# Patient Record
Sex: FEMALE | Race: BLACK OR AFRICAN AMERICAN | NOT HISPANIC OR LATINO | Employment: FULL TIME | URBAN - METROPOLITAN AREA
[De-identification: names, ages, dates, MRNs, and addresses within clinical notes are randomized per-mention and may not be internally consistent; named-entity substitution may affect disease eponyms.]

---

## 2017-03-13 ENCOUNTER — PRENATAL OFFICE VISIT (OUTPATIENT)
Dept: NURSING | Facility: CLINIC | Age: 37
End: 2017-03-13
Payer: COMMERCIAL

## 2017-03-13 ENCOUNTER — TELEPHONE (OUTPATIENT)
Dept: FAMILY MEDICINE | Facility: CLINIC | Age: 37
End: 2017-03-13

## 2017-03-13 VITALS
WEIGHT: 191.75 LBS | TEMPERATURE: 98 F | BODY MASS INDEX: 30.82 KG/M2 | HEIGHT: 66 IN | SYSTOLIC BLOOD PRESSURE: 129 MMHG | DIASTOLIC BLOOD PRESSURE: 82 MMHG | HEART RATE: 72 BPM

## 2017-03-13 DIAGNOSIS — N91.2 AMENORRHEA: Primary | ICD-10-CM

## 2017-03-13 LAB — BETA HCG QUAL IFA URINE: NEGATIVE

## 2017-03-13 PROCEDURE — 99207 ZZC NO CHARGE NURSE ONLY: CPT

## 2017-03-13 PROCEDURE — 84703 CHORIONIC GONADOTROPIN ASSAY: CPT | Performed by: FAMILY MEDICINE

## 2017-03-13 RX ORDER — ARIPIPRAZOLE 2 MG/1
2 TABLET ORAL AT BEDTIME
COMMUNITY
End: 2017-11-02

## 2017-03-13 NOTE — PATIENT INSTRUCTIONS
"Try a bland \"BRAT\" diet for a few days, avoid dairy products (milk, cheese), avoid fried foods, try dry toast, crackers, pretzels, bananas, applesauce, rice without a lot of butter.     Make sure to drink plenty of clear liquids, avoid caffeine if possible.  If you continue to have nausea, breast-tenderness, and no period after 2 more weeks, do a home pregnancy test and call to schedule the nurse intake.    If you are vomiting more often, having diarrhea, fever or not urinating at least once in  8-12 hours, call to get seen or go to ER for evaluation of dehydration.  "

## 2017-03-13 NOTE — TELEPHONE ENCOUNTER
Called and spoke with patient.  She stated that she's been sick for a couple days in the morning.  She wakes up sick to her stomach but doesn't throw up.  She states her last menstrual cycle lasted for only 1 day.  That's never happened before per patient report.    RN offered provider visit based on symptoms vs RN pre- visit.  Did explain that first RN visit is an hour with a urine test and education.  Patient verbalized understanding.  She would like to see RN for this.  Scheduled today.    Silvia Angelo RN  [facility]

## 2017-03-13 NOTE — MR AVS SNAPSHOT
"              After Visit Summary   3/13/2017    Traci Weiner    MRN: [id number]           Patient Information     Date Of Birth          1980        Visit Information        Provider Department      3/13/2017 3:00 PM [facility]        Today's Diagnoses     Amenorrhea    -  1      Care Instructions    Try a bland \"BRAT\" diet for a few days, avoid dairy products (milk, cheese), avoid fried foods, try dry toast, crackers, pretzels, bananas, applesauce, rice without a lot of butter.     Make sure to drink plenty of clear liquids, avoid caffeine if possible.  If you continue to have nausea, breast-tenderness, and no period after 2 more weeks, do a home pregnancy test and call to schedule the nurse intake.    If you are vomiting more often, having diarrhea, fever or not urinating at least once in  8-12 hours, call to get seen or go to ER for evaluation of dehydration.        Follow-ups after your visit        Who to contact     If you have questions or need follow up information about today's clinic visit or your schedule please contact [facility] directly at [phone].  Normal or non-critical lab and imaging results will be communicated to you by delicioushart, letter or phone within 4 business days after the clinic has received the results. If you do not hear from us within 7 days, please contact the clinic through [employer] or phone. If you have a critical or abnormal lab result, we will notify you by phone as soon as possible.  Submit refill requests through [employer] or call your pharmacy and they will forward the refill request to us. Please allow 3 business days for your refill to be completed.          Additional Information About Your Visit        MyChart Information     [employer] lets you send messages to your doctor, view your test results, renew your prescriptions, schedule appointments and more. To sign up, go to [email or web address] . Click on \"Log in\" " "on the left side of the screen, which will take you to the Welcome page. Then click on \"Sign up Now\" on the right side of the page.     You will be asked to enter the access code listed below, as well as some personal information. Please follow the directions to create your username and password.     Your access code is: [email or web address]  Expires: 2017  3:45 PM     Your access code will  in 90 days. If you need help or a new code, please call your [facility] or [phone].        Care EveryWhere ID     This is your Care EveryWhere ID. This could be used by other organizations to access your [facility] medical records  [id number]        Your Vitals Were     Pulse Temperature Height Last Period Breastfeeding? BMI (Body Mass Index)    72 98  F (36.7  C) (Oral) 5' 5.5\" (1.664 m) 2017 (Exact Date) No 31.42 kg/m2       Blood Pressure from Last 3 Encounters:   17 129/82    Weight from Last 3 Encounters:   17 191 lb 12 oz (87 kg)              We Performed the Following     Beta HCG qual IFA urine        Primary Care Provider    None Specified       No primary provider on file.        Thank you!     Thank you for choosing [facility]  for your care. Our goal is always to provide you with excellent care. Hearing back from our patients is one way we can continue to improve our services. Please take a few minutes to complete the written survey that you may receive in the mail after your visit with us. Thank you!             Your Updated Medication List - Protect others around you: Learn how to safely use, store and throw away your medicines at www.disposemymeds.org.          This list is accurate as of: 3/13/17  3:45 PM.  Always use your most recent med list.                   Brand Name Dispense Instructions for use    ABILIFY 2 MG tablet   Generic drug:  ARIPiprazole      Take 2 mg by mouth At Bedtime       traZODone 25 MG Tabs half-tab    DESYREL     Take 25 mg by mouth At " Bedtime

## 2017-03-13 NOTE — NURSING NOTE
"Chief Complaint   Patient presents with     Nausea     reports nausea and emesis in the morning for past 2-3 days, breast tenderness.  LMP 3/3/17       Initial /82 (BP Location: Right arm, Patient Position: Chair, Cuff Size: Adult Regular)  Pulse 72  Temp 98  F (36.7  C) (Oral)  Ht 5' 5.5\" (1.664 m)  Wt 191 lb 12 oz (87 kg)  LMP 03/03/2017 (Exact Date)  Breastfeeding? No  BMI 31.42 kg/m2 Estimated body mass index is 31.42 kg/(m^2) as calculated from the following:    Height as of this encounter: 5' 5.5\" (1.664 m).    Weight as of this encounter: 191 lb 12 oz (87 kg).  Medication Reconciliation: complete      Patient reports history of 4 previous c-sections, last one 5 years ago.   States she had tubal ligation after last baby but was told it was still possible to get pregnant.   She reports her periods have been pretty regular and last about 5 days.  She had one episode of vaginal bleeding on 3/3/17 and then no more.   She denies abdominal pain or fever.  States she is nauseated off and on when faced with food during the day, emesis 1-2 times in the AM.  Breasts are tender to the point she cannot bear to wear a bra.     She recently moved from [facility].  Wants to establish care here.    Urine HCG negative today.   By dates she would be less than 2 weeks' pregnant if she is so may be too early to detect hormone in urine.     Vitals stable.   Denies diarrhea or abdominal pain.   States she is urinating a good amount, no signs of dehydration.     Advised her on bland/BRAT diet and to monitor, if symptoms persist and no period in a couple of weeks, home test and schedule if positive or return for another RN confirmation.  Advised I'd hold her prenatal questionnaire for a month in case she comes back and is pregnant.   Offered to schedule her to establish care, she declines at this time; will wait to see if she is pregnant in a few weeks first.  Advised her to stop at  to complete " form to have records transferred.  Patient verbalized understanding of and agreement with plan.  See patient instructions as well.  Monica Mark RN  [facility]

## 2017-03-13 NOTE — TELEPHONE ENCOUNTER
Reason for call:  Patient reporting a symptom    Symptom or request: Patient thinks she might have a tubal pregnancy but doesn't know if she is pregnant or not.  Patient doesn't understand that her first appointment would be with an RN for an hour.   She would like an RN to call her back to discuss.    Duration (how long have symptoms been present): ?    Have you been treated for this before? No    Additional comments: Please have an RN call this patient back.  She recently moved to [place] and would like to establish care at our Clinic.    Phone Number patient can be reached at:  Cell number on file:    Telephone Information:   Mobile [phone]       Best Time:  anytime    Can we leave a detailed message on this number:  YES    Call taken on 3/13/2017 at 2:28 PM by Reba Drew

## 2017-08-10 ENCOUNTER — OFFICE VISIT (OUTPATIENT)
Dept: FAMILY MEDICINE | Facility: CLINIC | Age: 37
End: 2017-08-10
Payer: COMMERCIAL

## 2017-08-10 VITALS
BODY MASS INDEX: 26.89 KG/M2 | HEIGHT: 68 IN | SYSTOLIC BLOOD PRESSURE: 110 MMHG | WEIGHT: 177.4 LBS | TEMPERATURE: 98.3 F | DIASTOLIC BLOOD PRESSURE: 66 MMHG | HEART RATE: 61 BPM | OXYGEN SATURATION: 100 %

## 2017-08-10 DIAGNOSIS — R11.2 NAUSEA AND VOMITING, INTRACTABILITY OF VOMITING NOT SPECIFIED, UNSPECIFIED VOMITING TYPE: ICD-10-CM

## 2017-08-10 DIAGNOSIS — Z32.01 PREGNANCY TEST POSITIVE: Primary | ICD-10-CM

## 2017-08-10 LAB
BETA HCG QUAL IFA URINE: NEGATIVE
HCG SERPL QL: NEGATIVE

## 2017-08-10 PROCEDURE — 84703 CHORIONIC GONADOTROPIN ASSAY: CPT | Performed by: PHYSICIAN ASSISTANT

## 2017-08-10 PROCEDURE — 99214 OFFICE O/P EST MOD 30 MIN: CPT | Performed by: PHYSICIAN ASSISTANT

## 2017-08-10 NOTE — MR AVS SNAPSHOT
"              After Visit Summary   8/10/2017    Traci Weiner    MRN: [id number]           Patient Information     Date Of Birth          1980        Visit Information        Provider Department      8/10/2017 10:20 AM Eli Alvarado PA-C [facility]        Today's Diagnoses     Pregnancy test positive    -  1    Nausea and vomiting, intractability of vomiting not specified, unspecified vomiting type           Follow-ups after your visit        Who to contact     If you have questions or need follow up information about today's clinic visit or your schedule please contact [facility] directly at [phone].  Normal or non-critical lab and imaging results will be communicated to you by payasUgymhart, letter or phone within 4 business days after the clinic has received the results. If you do not hear from us within 7 days, please contact the clinic through MyChart or phone. If you have a critical or abnormal lab result, we will notify you by phone as soon as possible.  Submit refill requests through [employer] or call your pharmacy and they will forward the refill request to us. Please allow 3 business days for your refill to be completed.          Additional Information About Your Visit        MyChart Information     [employer] lets you send messages to your doctor, view your test results, renew your prescriptions, schedule appointments and more. To sign up, go to [email or web address] . Click on \"Log in\" on the left side of the screen, which will take you to the Welcome page. Then click on \"Sign up Now\" on the right side of the page.     You will be asked to enter the access code listed below, as well as some personal information. Please follow the directions to create your username and password.     Your access code is: [email or web address]  Expires: 2017 11:14 AM     Your access code will  in 90 days. If you need help or a new code, please call your [facility] " "or [phone].        Care EveryWhere ID     This is your Care EveryWhere ID. This could be used by other organizations to access your [place] medical records  [id number]        Your Vitals Were     Pulse Temperature Height Last Period Pulse Oximetry Breastfeeding?    61 98.3  F (36.8  C) (Oral) 5' 7.84\" (1.723 m) 07/01/2017 100% No    BMI (Body Mass Index)                   27.1 kg/m2            Blood Pressure from Last 3 Encounters:   08/10/17 110/66   03/13/17 129/82    Weight from Last 3 Encounters:   08/10/17 177 lb 6.4 oz (80.5 kg)   03/13/17 191 lb 12 oz (87 kg)              We Performed the Following     Beta HCG qual IFA urine     HCG, qualitative        Primary Care Provider    None Specified       No primary provider on file.        Equal Access to Services     YA DUFFY : Tha Rivera, waconcha mora, silverio manjarrez, emilia yuen . So [facility] [phone].    ATENCIÓN: Si habla español, tiene a romo disposición servicios gratuitos de asistencia lingüística. Mt al [phone].    We comply with applicable federal civil rights laws and [place] laws. We do not discriminate on the basis of race, color, national origin, age, disability sex, sexual orientation or gender identity.            Thank you!     Thank you for choosing [facility]  for your care. Our goal is always to provide you with excellent care. Hearing back from our patients is one way we can continue to improve our services. Please take a few minutes to complete the written survey that you may receive in the mail after your visit with us. Thank you!             Your Updated Medication List - Protect others around you: Learn how to safely use, store and throw away your medicines at www.disposemymeds.org.          This list is accurate as of: 8/10/17 11:14 AM.  Always use your most recent med list.                   Brand Name Dispense Instructions for use " Diagnosis    ABILIFY 2 MG tablet   Generic drug:  ARIPiprazole      Take 2 mg by mouth At Bedtime        traZODone 25 mg Tabs half-tab    DESYREL     Take 25 mg by mouth At Bedtime

## 2017-08-10 NOTE — NURSING NOTE
"Chief Complaint   Patient presents with     Vomiting     x5 days     Patient Request     pregnancy blood test     Health Maintenance     Tetanus and Pap       Initial /66 (BP Location: Right arm, Patient Position: Chair, Cuff Size: Adult Large)  Pulse 61  Temp 98.3  F (36.8  C) (Oral)  Ht 5' 7.84\" (1.723 m)  Wt 177 lb 6.4 oz (80.5 kg)  LMP 07/01/2017  SpO2 100%  Breastfeeding? No  BMI 27.1 kg/m2 Estimated body mass index is 27.1 kg/(m^2) as calculated from the following:    Height as of this encounter: 5' 7.84\" (1.723 m).    Weight as of this encounter: 177 lb 6.4 oz (80.5 kg).  Medication Reconciliation: sahra Fountain MA      "

## 2017-08-10 NOTE — PROGRESS NOTES
SUBJECTIVE:                                                    Traic Weiner is a 37 year old female who presents to clinic today for the following health issues:      Concern: Pt is asking for a pregnancy test.     Vomiting       Duration: 5 days    Description (location/character/radiation):  Lower abdominal     Intensity:  Waxing and waning     Accompanying signs and symptoms: none    History (similar episodes/previous evaluation): None    Precipitating or alleviating factors: None    Therapies tried and outcome: None     Was asleep and had a pain in her stomach that woke her up. Nausea started and then she was vomiting.   Now it has been happening for the last 5 days. Always occurring in the morning.   Yesterday she was throwing up in the evening all day. No vomiting today.   Took a home pregnancy test yesterday at home. Was positive.   Patient had a tubal 5 years.     No diarrhea,. At the ER temp was 99.9. No pain with urination. NO vaginal discharge or odors. No heartburn.     Patient went to  and was sent to the ER. Went to [place] on 17 but left without being seen.     Had a period on 17 that was 4 days long. Then started spotting on 17 that was light pink and lasted 3 days.     Problem list and histories reviewed & adjusted, as indicated.  Additional history: as documented    There is no problem list on file for this patient.    Past Surgical History:   Procedure Laterality Date     APPENDECTOMY  2016     CHOLECYSTECTOMY  2004    gall bladder removed     GYN SURGERY       X 4       Social History   Substance Use Topics     Smoking status: Current Every Day Smoker     Packs/day: 0.30     Years: 20.00     Types: Cigarettes     Start date: 3/13/1997     Smokeless tobacco: Never Used     Alcohol use No     Family History   Problem Relation Age of Onset     DIABETES Mother      Thyroid Disease Mother      Hypertension Mother      DIABETES Father      Hypertension Father       "        Reviewed and updated as needed this visit by clinical staffTobacco  Allergies  Meds  Problems  Med Hx  Surg Hx  Fam Hx  Soc Hx        Reviewed and updated as needed this visit by Provider  Allergies  Meds  Problems         ROS:  Constitutional, HEENT, cardiovascular, pulmonary, gi and gu systems are negative, except as otherwise noted.      OBJECTIVE:   /66 (BP Location: Right arm, Patient Position: Chair, Cuff Size: Adult Large)  Pulse 61  Temp 98.3  F (36.8  C) (Oral)  Ht 5' 7.84\" (1.723 m)  Wt 177 lb 6.4 oz (80.5 kg)  LMP 07/01/2017  SpO2 100%  Breastfeeding? No  BMI 27.1 kg/m2  Body mass index is 27.1 kg/(m^2).  GENERAL: healthy, alert and no distress  RESP: lungs clear to auscultation - no rales, rhonchi or wheezes  CV: regular rate and rhythm, normal S1 S2, no S3 or S4, no murmur, click or rub, no peripheral edema   ABDOMEN: soft, nontender, no hepatosplenomegaly, no masses and bowel sounds normal    Diagnostic Test Results:  Results for orders placed or performed in visit on 08/10/17 (from the past 24 hour(s))   Beta HCG qual IFA urine   Result Value Ref Range    Beta HCG Qual IFA Urine Negative NEG       ASSESSMENT/PLAN:       ICD-10-CM    1. Pregnancy test positive Z32.01 Beta HCG qual IFA urine     HCG, qualitative   2. Nausea and vomiting, intractability of vomiting not specified, unspecified vomiting type R11.2    Negative urine test today, confirm with blood test. Reassured patient that pregnancy is unlikely with history of tubal. Benign exam with no vomiting today.Suspect viral illness. return to clinic if not improving as expected. Increase fluids, bland foods.     FUTURE APPOINTMENTS:       - Follow-up for annual visit or as needed    Eli Alvarado PA-C  [facility]  "

## 2017-08-11 ENCOUNTER — TELEPHONE (OUTPATIENT)
Dept: FAMILY MEDICINE | Facility: CLINIC | Age: 37
End: 2017-08-11

## 2017-08-11 NOTE — TELEPHONE ENCOUNTER
Patient returned call - relayed below information - patient had no questions.    Carley Miller RN  [facility]

## 2017-08-11 NOTE — TELEPHONE ENCOUNTER
Attempt # 1  Called patient at home number.  Was call answered?  No, left message to call nurse line for results.  Carley Miller RN  [facility] [place]

## 2017-10-17 ENCOUNTER — TELEPHONE (OUTPATIENT)
Dept: FAMILY MEDICINE | Facility: CLINIC | Age: 37
End: 2017-10-17

## 2017-10-17 NOTE — TELEPHONE ENCOUNTER
Panel Management Review      Patient has the following on her problem list: None      Composite cancer screening  Chart review shows that this patient is due/due soon for the following Pap Smear  Summary:    Patient is due/failing the following:   PAP    Action needed:   Patient needs office visit for physical with pap screen.    Type of outreach:    Phone, left message for patient to call back.  and Phone, spoke to patient.  scheduled physical 10/19/17 with FACUNDO Sarmiento    Questions for provider review:    None                                                                                                                                    YUNIER Antoine MA       Chart routed to closing chart .

## 2017-10-24 ENCOUNTER — TELEPHONE (OUTPATIENT)
Dept: FAMILY MEDICINE | Facility: CLINIC | Age: 37
End: 2017-10-24

## 2017-10-24 ENCOUNTER — OFFICE VISIT (OUTPATIENT)
Dept: FAMILY MEDICINE | Facility: CLINIC | Age: 37
End: 2017-10-24
Payer: COMMERCIAL

## 2017-10-24 VITALS
WEIGHT: 170 LBS | DIASTOLIC BLOOD PRESSURE: 67 MMHG | BODY MASS INDEX: 25.97 KG/M2 | HEART RATE: 74 BPM | TEMPERATURE: 98.1 F | OXYGEN SATURATION: 99 % | SYSTOLIC BLOOD PRESSURE: 97 MMHG

## 2017-10-24 DIAGNOSIS — R07.9 CHEST PAIN, UNSPECIFIED TYPE: Primary | ICD-10-CM

## 2017-10-24 DIAGNOSIS — R07.89 ATYPICAL CHEST PAIN: Primary | ICD-10-CM

## 2017-10-24 PROCEDURE — 93010 ELECTROCARDIOGRAM REPORT: CPT | Performed by: INTERNAL MEDICINE

## 2017-10-24 PROCEDURE — 99214 OFFICE O/P EST MOD 30 MIN: CPT | Performed by: NURSE PRACTITIONER

## 2017-10-24 PROCEDURE — 93005 ELECTROCARDIOGRAM TRACING: CPT | Performed by: NURSE PRACTITIONER

## 2017-10-24 ASSESSMENT — PAIN SCALES - GENERAL: PAINLEVEL: MODERATE PAIN (5)

## 2017-10-24 NOTE — MR AVS SNAPSHOT
After Visit Summary   10/24/2017    Traci Weiner    MRN: [id number]           Patient Information     Date Of Birth          1980        Visit Information        Provider Department      10/24/2017 10:00 AM Brittani Giordano APRN CNP [facility]        Today's Diagnoses     Chest pain, unspecified type    -  1      Care Instructions    You can take ibuprofen or tylenol as needed for pain  400 mg ibuprofen every 4 hours as needed for pain  Try heat pad    If the pain is not relieved with the above measures, is accompanied with nausea and/or excessive sweating or shortness of breath, or worsened with physical activity, call 911          Follow-ups after your visit        Your next 10 appointments already scheduled     Oct 31, 2017  9:00 AM CDT   PHYSICAL with Eli Alvarado PA-C   [facility] ([facility]    [address] [place] [address] [id number] [phone]              Who to contact     If you have questions or need follow up information about today's clinic visit or your schedule please contact [facility] directly at [phone].  Normal or non-critical lab and imaging results will be communicated to you by Allclasseshart, letter or phone within 4 business days after the clinic has received the results. If you do not hear from us within 7 days, please contact the clinic through [employer] or phone. If you have a critical or abnormal lab result, we will notify you by phone as soon as possible.  Submit refill requests through [employer] or call your pharmacy and they will forward the refill request to us. Please allow 3 business days for your refill to be completed.          Additional Information About Your Visit        AllclassesharClerky Information     [employer] lets you send messages to your doctor, view your test results, renew your prescriptions, schedule appointments and more. To sign up, go  "to [email or web address] . Click on \"Log in\" on the left side of the screen, which will take you to the Welcome page. Then click on \"Sign up Now\" on the right side of the page.     You will be asked to enter the access code listed below, as well as some personal information. Please follow the directions to create your username and password.     Your access code is: [email or web address]  Expires: 2017 11:14 AM     Your access code will  in 90 days. If you need help or a new code, please call your [place] clinic or [phone].        Care EveryWhere ID     This is your Care EveryWhere ID. This could be used by other organizations to access your [place] medical records  [id number]        Your Vitals Were     Pulse Temperature Last Period Pulse Oximetry Breastfeeding? BMI (Body Mass Index)    74 98.1  F (36.7  C) (Oral) 10/02/2017 99% No 25.97 kg/m2       Blood Pressure from Last 3 Encounters:   10/24/17 97/67   08/10/17 110/66   17 129/82    Weight from Last 3 Encounters:   10/24/17 170 lb (77.1 kg)   08/10/17 177 lb 6.4 oz (80.5 kg)   17 191 lb 12 oz (87 kg)              We Performed the Following     EKG 12-lead, tracing only        Primary Care Provider Office Phone # Fax #    [facility] [phone]       [address] [place] [id number]        Equal Access to Services     YA DUFFY : Hadii robyn ledezma hadasho Soomaali, waaxda luqadaha, qaybta kaalmada adeegyada, emilia fan. So [facility] [phone].    ATENCIÓN: Si habla español, tiene a romo disposición servicios gratuitos de asistencia lingüística. Llame al [phone].    We comply with applicable federal civil rights laws and [place] laws. We do not discriminate on the basis of race, color, national origin, age, disability, sex, sexual orientation, or gender identity.            Thank you!     Thank you for choosing [facility]  for your " care. Our goal is always to provide you with excellent care. Hearing back from our patients is one way we can continue to improve our services. Please take a few minutes to complete the written survey that you may receive in the mail after your visit with us. Thank you!             Your Updated Medication List - Protect others around you: Learn how to safely use, store and throw away your medicines at www.disposemymeds.org.          This list is accurate as of: 10/24/17 10:50 AM.  Always use your most recent med list.                   Brand Name Dispense Instructions for use Diagnosis    ABILIFY 2 MG tablet   Generic drug:  ARIPiprazole      Take 2 mg by mouth At Bedtime        traZODone 25 mg Tabs half-tab    DESYREL     Take 25 mg by mouth At Bedtime

## 2017-10-24 NOTE — PROGRESS NOTES
"  SUBJECTIVE:   Traci Weiner is a 37 year old female who presents to clinic today for the following health issues:      Chest Pain       She has had chest pain for 3 days  First noticed after waking up from a nap  The pain did not wake her up  She initially thought it was heart burn.   Tried drinking soda, belched and had temporary relief but it resumed yesterday  Midsternal chest pain today  Mild nausea, no emesis  Has anxiety at baseline, worse recently  Stress  Denies suicidal ideation  No family history of early onset heart disease  Her mom had stroke at age 50s  Her brother had a stroke at age 47  Denies paresthesias, weakness, confusion  No arm or jaw pain  Denies lightheadedness  She admits to increased stress recently  Had some pain relief with 200 mg ibuprofen  Pain worsened by: \"sitting up in bed\"   Worse in sitting position  Denies injury or trauma  This is a new pain for her      Problem list and histories reviewed & adjusted, as indicated.  Additional history: none    There is no problem list on file for this patient.    Past Surgical History:   Procedure Laterality Date     APPENDECTOMY  2016     CHOLECYSTECTOMY  2004    gall bladder removed     GYN SURGERY       X 4       Social History   Substance Use Topics     Smoking status: Current Every Day Smoker     Packs/day: 0.30     Years: 20.00     Types: Cigarettes     Start date: 3/13/1997     Smokeless tobacco: Never Used     Alcohol use No     Family History   Problem Relation Age of Onset     DIABETES Mother      Thyroid Disease Mother      Hypertension Mother      DIABETES Father      Hypertension Father              Reviewed and updated as needed this visit by clinical staffTobacco  Allergies  Meds  Med Hx  Surg Hx  Fam Hx  Soc Hx      Reviewed and updated as needed this visit by Provider         ROS:  Constitutional, HEENT, cardiovascular, pulmonary, gi and gu systems are negative, except as otherwise noted.      OBJECTIVE: "   BP 97/67 (BP Location: Right arm, Patient Position: Chair, Cuff Size: Adult Regular)  Pulse 74  Temp 98.1  F (36.7  C) (Oral)  Wt 170 lb (77.1 kg)  LMP 10/02/2017  SpO2 99%  Breastfeeding? No  BMI 25.97 kg/m2  Body mass index is 25.97 kg/(m^2).  GENERAL: healthy, alert and no distress  RESP: lungs clear to auscultation - no rales, rhonchi or wheezes  CV: regular rate and rhythm, normal S1 S2, no S3 or S4, no murmur, click or rub, no peripheral edema and peripheral pulses strong  ABDOMEN: soft, nontender, no hepatosplenomegaly, no masses and bowel sounds normal  MS: Pain to palpation mid sternum. No swelling or crepitus  SKIN: no suspicious lesions or rashes. Skin intact  NEURO: Normal strength and tone, mentation intact and speech normal, CN II-XII intact. Strength symmetric. Sensation intact    Diagnostic Test Results:  ECG: NSR    ASSESSMENT/PLAN:       ICD-10-CM    1. Chest pain, unspecified type R07.9 EKG 12-lead, tracing only     Chest pain reproducible and positional which is not consistent with cardiac etiology, but I did do ECG to rule out  Exam otherwise unremarkable. Consider anxiety or MSK cause  She is not interested in talking about stress or anxiety. She has follow up appt scheduled with her primary care provider and I advised she can discuss it at that time if she wishes  Neuro exam unremarakble  We discussed atypical S/S of ACS in females and when to call 911. Otherwise, recommend treating with over the counter NSAIDs and heat/ice      Patient Instructions   You can take ibuprofen or tylenol as needed for pain  400 mg ibuprofen every 4 hours as needed for pain  Try heat pad    If the pain is not relieved with the above measures, is accompanied with nausea and/or excessive sweating or shortness of breath, or worsened with physical activity, call 911      YAW Rodriguez [facility]

## 2017-10-24 NOTE — NURSING NOTE
"Chief Complaint   Patient presents with     URI       Initial BP 97/67 (BP Location: Right arm, Patient Position: Chair, Cuff Size: Adult Regular)  Pulse 74  Temp 98.1  F (36.7  C) (Oral)  Wt 170 lb (77.1 kg)  LMP 10/02/2017  SpO2 99%  Breastfeeding? No  BMI 25.97 kg/m2 Estimated body mass index is 25.97 kg/(m^2) as calculated from the following:    Height as of 8/10/17: 5' 7.84\" (1.723 m).    Weight as of this encounter: 170 lb (77.1 kg).  Medication Reconciliation: complete.  YUNIER Antoine MA    EKG was done.    "

## 2017-10-24 NOTE — PATIENT INSTRUCTIONS
You can take ibuprofen or tylenol as needed for pain  400 mg ibuprofen every 4 hours as needed for pain  Try heat pad    If the pain is not relieved with the above measures, is accompanied with nausea and/or excessive sweating or shortness of breath, or worsened with physical activity, call [phone]

## 2017-10-24 NOTE — TELEPHONE ENCOUNTER
Panel Management Review      Patient has the following on her problem list: None      Composite cancer screening  Chart review shows that this patient is due/due soon for the following Pap Smear  Summary:    Patient is due/failing the following:   PAP    Action needed:   Patient needs office visit for Physical with pap screen.    Type of outreach:    Spoke with patient and scheduled physical with pap screen on 10/30/17 withFACUNDO Sarmiento    Questions for provider review:    None                                                                                                                                    YUNIER Antoine MA       Chart routed to Closing chart .

## 2017-11-02 ENCOUNTER — OFFICE VISIT (OUTPATIENT)
Dept: FAMILY MEDICINE | Facility: CLINIC | Age: 37
End: 2017-11-02
Payer: COMMERCIAL

## 2017-11-02 VITALS
SYSTOLIC BLOOD PRESSURE: 100 MMHG | OXYGEN SATURATION: 100 % | BODY MASS INDEX: 25.3 KG/M2 | DIASTOLIC BLOOD PRESSURE: 58 MMHG | WEIGHT: 165.6 LBS | TEMPERATURE: 98 F | HEART RATE: 66 BPM

## 2017-11-02 DIAGNOSIS — Z00.00 ROUTINE GENERAL MEDICAL EXAMINATION AT A HEALTH CARE FACILITY: Primary | ICD-10-CM

## 2017-11-02 DIAGNOSIS — Z23 NEED FOR PROPHYLACTIC VACCINATION WITH TETANUS-DIPHTHERIA (TD): ICD-10-CM

## 2017-11-02 DIAGNOSIS — Z11.3 SCREEN FOR STD (SEXUALLY TRANSMITTED DISEASE): ICD-10-CM

## 2017-11-02 DIAGNOSIS — Z12.4 SCREENING FOR MALIGNANT NEOPLASM OF CERVIX: ICD-10-CM

## 2017-11-02 PROCEDURE — 87591 N.GONORRHOEAE DNA AMP PROB: CPT | Performed by: PHYSICIAN ASSISTANT

## 2017-11-02 PROCEDURE — 87491 CHLMYD TRACH DNA AMP PROBE: CPT | Performed by: PHYSICIAN ASSISTANT

## 2017-11-02 PROCEDURE — 87624 HPV HI-RISK TYP POOLED RSLT: CPT | Performed by: PHYSICIAN ASSISTANT

## 2017-11-02 PROCEDURE — 99395 PREV VISIT EST AGE 18-39: CPT | Mod: 25 | Performed by: PHYSICIAN ASSISTANT

## 2017-11-02 PROCEDURE — 90471 IMMUNIZATION ADMIN: CPT | Performed by: PHYSICIAN ASSISTANT

## 2017-11-02 PROCEDURE — G0145 SCR C/V CYTO,THINLAYER,RESCR: HCPCS | Performed by: PHYSICIAN ASSISTANT

## 2017-11-02 PROCEDURE — 90715 TDAP VACCINE 7 YRS/> IM: CPT | Performed by: PHYSICIAN ASSISTANT

## 2017-11-02 PROCEDURE — G0476 HPV COMBO ASSAY CA SCREEN: HCPCS | Performed by: PHYSICIAN ASSISTANT

## 2017-11-02 ASSESSMENT — PATIENT HEALTH QUESTIONNAIRE - PHQ9
SUM OF ALL RESPONSES TO PHQ QUESTIONS 1-9: 22
10. IF YOU CHECKED OFF ANY PROBLEMS, HOW DIFFICULT HAVE THESE PROBLEMS MADE IT FOR YOU TO DO YOUR WORK, TAKE CARE OF THINGS AT HOME, OR GET ALONG WITH OTHER PEOPLE: VERY DIFFICULT
SUM OF ALL RESPONSES TO PHQ QUESTIONS 1-9: 22

## 2017-11-02 NOTE — LETTER
[facility]   [address] [place]  [address]  [phone]                                   November 6, 2017    Traci Weiner  [address]  [facility] [address]        Dear Traci,    Your STD screening is negative.     Results for orders placed or performed in visit on 11/02/17   Chlamydia trachomatis PCR   Result Value Ref Range    Specimen Description Endocervical     Chlamydia Trachomatis PCR Negative NEG^Negative   Neisseria gonorrhoeae PCR   Result Value Ref Range    Specimen Descrip Endocervical     N Gonorrhea PCR Negative NEG^Negative       If you have any questions please call the clinic at [phone]    Sincerely,    Eli Alvarado PA-C  bmd

## 2017-11-02 NOTE — MR AVS SNAPSHOT
After Visit Summary   11/2/2017    Traci Weiner    MRN: [id number]           Patient Information     Date Of Birth          1980        Visit Information        Provider Department      11/2/2017 10:40 AM Eli Alvarado PA-C [facility]        Today's Diagnoses     Routine general medical examination at a health care facility    -  1    Screening for malignant neoplasm of cervix        Need for prophylactic vaccination with tetanus-diphtheria (TD)        Screen for STD (sexually transmitted disease)          Care Instructions      Preventive Health Recommendations  Female Ages 26 - 39  Yearly exam:   See your health care provider every year in order to    Review health changes.     Discuss preventive care.      Review your medicines if you your doctor has prescribed any.    Until age 30: Get a Pap test every three years (more often if you have had an abnormal result).    After age 30: Talk to your doctor about whether you should have a Pap test every 3 years or have a Pap test with HPV screening every 5 years.   You do not need a Pap test if your uterus was removed (hysterectomy) and you have not had cancer.  You should be tested each year for STDs (sexually transmitted diseases), if you're at risk.   Talk to your provider about how often to have your cholesterol checked.  If you are at risk for diabetes, you should have a diabetes test (fasting glucose).  Shots: Get a flu shot each year. Get a tetanus shot every 10 years.   Nutrition:     Eat at least 5 servings of fruits and vegetables each day.    Eat whole-grain bread, whole-wheat pasta and brown rice instead of white grains and rice.    Talk to your provider about Calcium and Vitamin D.     Lifestyle    Exercise at least 150 minutes a week (30 minutes a day, 5 days of the week). This will help you control your weight and prevent disease.    Limit alcohol to one drink per day.    No smoking.     Wear sunscreen to  "prevent skin cancer.    See your dentist every six months for an exam and cleaning.            Follow-ups after your visit        Who to contact     If you have questions or need follow up information about today's clinic visit or your schedule please contact [facility] directly at [phone].  Normal or non-critical lab and imaging results will be communicated to you by MyChart, letter or phone within 4 business days after the clinic has received the results. If you do not hear from us within 7 days, please contact the clinic through MyChart or phone. If you have a critical or abnormal lab result, we will notify you by phone as soon as possible.  Submit refill requests through [facility] or call your pharmacy and they will forward the refill request to us. Please allow 3 business days for your refill to be completed.          Additional Information About Your Visit        MyChart Information     [facility] lets you send messages to your doctor, view your test results, renew your prescriptions, schedule appointments and more. To sign up, go to [email or web address] . Click on \"Log in\" on the left side of the screen, which will take you to the Welcome page. Then click on \"Sign up Now\" on the right side of the page.     You will be asked to enter the access code listed below, as well as some personal information. Please follow the directions to create your username and password.     Your access code is: [email or web address]  Expires: 2017 11:14 AM     Your access code will  in 90 days. If you need help or a new code, please call your [facility] or [phone].        Care EveryWhere ID     This is your Care EveryWhere ID. This could be used by other organizations to access your [place] medical records  [id number]        Your Vitals Were     Pulse Temperature Last Period Pulse Oximetry Breastfeeding? BMI (Body Mass Index)    66 98  F (36.7  C) (Oral) 10/02/2017 100% No 25.3 kg/m2       " Blood Pressure from Last 3 Encounters:   11/02/17 100/58   10/24/17 97/67   08/10/17 110/66    Weight from Last 3 Encounters:   11/02/17 165 lb 9.6 oz (75.1 kg)   10/24/17 170 lb (77.1 kg)   08/10/17 177 lb 6.4 oz (80.5 kg)              We Performed the Following          ADMIN VACCINE, FIRST     Chlamydia trachomatis PCR     HPV High Risk Types DNA Cervical     Neisseria gonorrhoeae PCR     Pap imaged thin layer screen with HPV - recommended age 30 - 65 years (select HPV order below)     SCREENING QUESTIONS FOR ADULT IMMUNIZATIONS     TDAP VACCINE (ADACEL)        Primary Care Provider Office Phone # Fax #    [facility] [phone]       [address] [place] [address]        Equal Access to Services     YA DUFFY : Tha martínez Soorlando, waaxda luqadaha, qaybta kaalmada adeegyabrandon, emilia fan. So [facility] [phone].    ATENCIÓN: Si habla español, tiene a romo disposición servicios gratuitos de asistencia lingüística. Llame al [phone].    We comply with applicable federal civil rights laws and [place] laws. We do not discriminate on the basis of race, color, national origin, age, disability, sex, sexual orientation, or gender identity.            Thank you!     Thank you for choosing [facility]  for your care. Our goal is always to provide you with excellent care. Hearing back from our patients is one way we can continue to improve our services. Please take a few minutes to complete the written survey that you may receive in the mail after your visit with us. Thank you!             Your Updated Medication List - Protect others around you: Learn how to safely use, store and throw away your medicines at www.disposemymeds.org.      Notice  As of 11/2/2017 11:12 AM    You have not been prescribed any medications.

## 2017-11-02 NOTE — NURSING NOTE
"Chief Complaint   Patient presents with     Physical     Health Maintenance     Tetanus, Pap, and Influenza       Initial /58 (BP Location: Right arm, Patient Position: Chair, Cuff Size: Adult Regular)  Pulse 66  Temp 98  F (36.7  C) (Oral)  Wt 165 lb 9.6 oz (75.1 kg)  LMP 10/02/2017  SpO2 100%  Breastfeeding? No  BMI 25.3 kg/m2 Estimated body mass index is 25.3 kg/(m^2) as calculated from the following:    Height as of 8/10/17: 5' 7.84\" (1.723 m).    Weight as of this encounter: 165 lb 9.6 oz (75.1 kg).  Medication Reconciliation: complete     GAYATRI Fountain MA      "

## 2017-11-02 NOTE — NURSING NOTE
Prior to injection verified patient identity using patient's name and date of birth.  GAYATRI Fountain MA    VIS for Influenza given on same date of administration.  Staff signature/Title: GAYATRI Fountain MA    Per orders of Eli Alvarado PA-C, injection of Influenza given by Naomi Fountain. Patient instructed to remain in clinic for 15 minutes afterwards, and to report any adverse reaction to me immediately.

## 2017-11-02 NOTE — PROGRESS NOTES
SUBJECTIVE:   CC: Traci Weiner is an 37 year old woman who presents for preventive health visit.     Physical   Annual:     Getting at least 3 servings of Calcium per day::  Yes    Bi-annual eye exam::  Yes    Dental care twice a year::  NO    Sleep apnea or symptoms of sleep apnea::  Daytime drowsiness and Sleep apnea    Frequency of exercise::  None    Taking medications regularly::  Yes    Additional concerns today::  No    Denies a possibility of pregnancy. Has been having irregular periods.   Would like GC/Chlamydia testing.       Today's PHQ-2 Score:   PHQ-2 ( 1999 Pfizer) 11/2/2017   Q1: Little interest or pleasure in doing things 3   Q2: Feeling down, depressed or hopeless 3   PHQ-2 Score 6   Q1: Little interest or pleasure in doing things Nearly every day   Q2: Feeling down, depressed or hopeless Nearly every day   PHQ-2 Score 6       Abuse: Current or Past(Physical, Sexual or Emotional)- No  Do you feel safe in your environment - Yes    Social History   Substance Use Topics     Smoking status: Current Every Day Smoker     Packs/day: 0.30     Years: 20.00     Types: Cigarettes     Start date: 3/13/1997     Smokeless tobacco: Never Used     Alcohol use No     The patient does not drink >3 drinks per day nor >7 drinks per week.    Reviewed orders with patient.  Reviewed health maintenance and updated orders accordingly - Yes  Labs reviewed in EPIC    Mammogram not appropriate for this patient based on age.    Pertinent mammograms are reviewed under the imaging tab.  History of abnormal Pap smear: NO - age 30-65 PAP every 5 years with negative HPV co-testing recommended    Reviewed and updated as needed this visit by clinical staff  Tobacco  Allergies  Meds  Problems  Med Hx  Surg Hx  Fam Hx  Soc Hx          Reviewed and updated as needed this visit by Provider  Allergies  Meds  Problems            Review of Systems  C: NEGATIVE for fever, chills, change in weight  I: NEGATIVE for worrisome  rashes, moles or lesions  E: NEGATIVE for vision changes or irritation  ENT: NEGATIVE for ear, mouth and throat problems  R: NEGATIVE for significant cough or SOB  B: NEGATIVE for masses, tenderness or discharge  CV: NEGATIVE for chest pain, palpitations or peripheral edema  GI: NEGATIVE for nausea, abdominal pain, heartburn, or change in bowel habits  : NEGATIVE for unusual urinary or vaginal symptoms. Periods are irregular.  M: NEGATIVE for significant arthralgias or myalgia  N: NEGATIVE for weakness, dizziness or paresthesias  P: NEGATIVE for changes in mood or affect     OBJECTIVE:   /58 (BP Location: Right arm, Patient Position: Chair, Cuff Size: Adult Regular)  Pulse 66  Temp 98  F (36.7  C) (Oral)  Wt 165 lb 9.6 oz (75.1 kg)  LMP 10/02/2017  SpO2 100%  Breastfeeding? No  BMI 25.3 kg/m2  Physical Exam  GENERAL: healthy, alert and no distress  EYES: Eyes grossly normal to inspection, PERRL and conjunctivae and sclerae normal  HENT: ear canals and TM's normal, nose and mouth without ulcers or lesions  NECK: no adenopathy, no asymmetry, masses, or scars and thyroid normal to palpation  RESP: lungs clear to auscultation - no rales, rhonchi or wheezes  BREAST: normal without masses, tenderness or nipple discharge and no palpable axillary masses or adenopathy  CV: regular rate and rhythm, normal S1 S2, no S3 or S4, no murmur, click or rub,   ABDOMEN: soft, nontender, no hepatosplenomegaly, no masses and bowel sounds normal   (female): normal female external genitalia, normal urethral meatus, vaginal mucosa pink, moist, well rugated, and normal cervix/adnexa/uterus without masses or discharge  MS: no gross musculoskeletal defects noted, no edema  SKIN: no suspicious lesions or rashes  NEURO: Normal strength and tone, mentation intact and speech normal  PSYCH: mentation appears normal, affect normal/bright    ASSESSMENT/PLAN:       ICD-10-CM    1. Routine general medical examination at a [facility]  "facility Z00.00    2. Screening for malignant neoplasm of cervix Z12.4 Pap imaged thin layer screen with HPV - recommended age 30 - 65 years (select HPV order below)     HPV High Risk Types DNA Cervical   3. Need for prophylactic vaccination with tetanus-diphtheria (TD) Z23 TDAP VACCINE (ADACEL)          ADMIN VACCINE, FIRST   4. Screen for STD (sexually transmitted disease) Z11.3 Chlamydia trachomatis PCR     Neisseria gonorrhoeae PCR       COUNSELING:  Reviewed preventive health counseling, as reflected in patient instructions       Regular exercise       Healthy diet/nutrition       Immunizations    Vaccinated for: TDAP and Declined: Influenza due to Conscientious objector             Contraception       Safe sex practices/STD prevention       reports that she has been smoking Cigarettes.  She started smoking about 20 years ago. She has a 6.00 pack-year smoking history. She has never used smokeless tobacco.  Tobacco Cessation Action Plan: Information offered: Patient not interested at this time  Estimated body mass index is 25.3 kg/(m^2) as calculated from the following:    Height as of 8/10/17: 5' 7.84\" (1.723 m).    Weight as of this encounter: 165 lb 9.6 oz (75.1 kg).   Weight management plan: Discussed healthy diet and exercise guidelines and patient will follow up in 12 months in clinic to re-evaluate.    Counseling Resources:  ATP IV Guidelines  Pooled Cohorts Equation Calculator  Breast Cancer Risk Calculator  FRAX Risk Assessment  ICSI Preventive Guidelines  Dietary Guidelines for Americans, 2010  USDA's MyPlate  ASA Prophylaxis  Lung CA Screening    Eli Alvarado PA-C  [facility]  Answers for HPI/ROS submitted by the patient on 11/2/2017   PHQ-2 Score: 6  If you checked off any problems, how difficult have these problems made it for you to do your work, take care of things at home, or get along with other people?: Very difficult  PHQ9 TOTAL SCORE: 22    "

## 2017-11-03 LAB
C TRACH DNA SPEC QL NAA+PROBE: NEGATIVE
N GONORRHOEA DNA SPEC QL NAA+PROBE: NEGATIVE
SPECIMEN SOURCE: NORMAL
SPECIMEN SOURCE: NORMAL

## 2017-11-03 ASSESSMENT — PATIENT HEALTH QUESTIONNAIRE - PHQ9: SUM OF ALL RESPONSES TO PHQ QUESTIONS 1-9: 22

## 2017-11-06 ENCOUNTER — TELEPHONE (OUTPATIENT)
Dept: FAMILY MEDICINE | Facility: CLINIC | Age: 37
End: 2017-11-06

## 2017-11-06 LAB
COPATH REPORT: NORMAL
PAP: NORMAL

## 2017-11-06 NOTE — TELEPHONE ENCOUNTER
Attempt # 1  Called patient at home number.  Was call answered? Yes, relayed results for STD and the pap test is still waiting on the HPV results.       Carley Miller RN  [facility]      
Reason for Call:  Request for results:    Name of test or procedure: Pap/ std     Date of test of procedure: 11/3/17    Location of the test or procedure: [facility] to leave the result message on voice mail or with a family member? NO    Phone number Patient can be reached at:  Home number on file [phone] (home)    Additional comments: any    Call taken on 11/6/2017 at 2:35 PM by Katya Olivares  
patient

## 2017-11-08 LAB
FINAL DIAGNOSIS: NORMAL
HPV HR 12 DNA CVX QL NAA+PROBE: NEGATIVE
HPV16 DNA SPEC QL NAA+PROBE: NEGATIVE
HPV18 DNA SPEC QL NAA+PROBE: NEGATIVE
SPECIMEN DESCRIPTION: NORMAL

## 2017-11-09 ENCOUNTER — TELEPHONE (OUTPATIENT)
Dept: FAMILY MEDICINE | Facility: CLINIC | Age: 37
End: 2017-11-09

## 2017-11-09 DIAGNOSIS — A59.01 TRICHOMONAL VAGINITIS: Primary | ICD-10-CM

## 2017-11-09 RX ORDER — METRONIDAZOLE 500 MG/1
2000 TABLET ORAL ONCE
Qty: 4 TABLET | Refills: 1 | Status: SHIPPED | OUTPATIENT
Start: 2017-11-09 | End: 2017-11-09

## 2017-11-09 NOTE — TELEPHONE ENCOUNTER
Please call patient. Her pap smear showed a sexually transmitted infection called trichamonas. She will need to be treated for this with a medication called flagyl.   She needs to take one dose and her partner should take 1 dose. They should abstain from intercourse for 1 week after treatment.   RN can send prescription to preferred pharmacy. Provider aware of dose and flag, this is appropriate dose.   Eli Alvarado PA-C

## 2017-11-09 NOTE — TELEPHONE ENCOUNTER
Attempt # 1  Called patient at home number.  Was call answered?  Yes, relayed below information - verified preferred pharmacy - sent medication - patient verbalizes understanding - has no questions.    Carley Miller RN  [facility]

## 2018-01-02 ENCOUNTER — OFFICE VISIT (OUTPATIENT)
Dept: FAMILY MEDICINE | Facility: CLINIC | Age: 38
End: 2018-01-02
Payer: COMMERCIAL

## 2018-01-02 VITALS
HEART RATE: 67 BPM | TEMPERATURE: 98 F | WEIGHT: 165.8 LBS | SYSTOLIC BLOOD PRESSURE: 113 MMHG | BODY MASS INDEX: 26.02 KG/M2 | HEIGHT: 67 IN | OXYGEN SATURATION: 99 % | DIASTOLIC BLOOD PRESSURE: 75 MMHG

## 2018-01-02 DIAGNOSIS — A59.9 TRICHOMONAL INFECTION: Primary | ICD-10-CM

## 2018-01-02 LAB
SPECIMEN SOURCE: NORMAL
T VAGINALIS DNA SPEC QL NAA+PROBE: NORMAL

## 2018-01-02 PROCEDURE — 87661 TRICHOMONAS VAGINALIS AMPLIF: CPT | Performed by: PHYSICIAN ASSISTANT

## 2018-01-02 PROCEDURE — 99212 OFFICE O/P EST SF 10 MIN: CPT | Performed by: PHYSICIAN ASSISTANT

## 2018-01-02 RX ORDER — ARIPIPRAZOLE 2 MG/1
2 TABLET ORAL DAILY
COMMUNITY
End: 2019-08-09

## 2018-01-02 NOTE — PROGRESS NOTES
"  SUBJECTIVE:   Traci Weiner is a 37 year old female who presents to clinic today for the following health issues:      Patient is here to follow up on last appointment and to see if it is cleared now.  Pt states today is the last day of her period.    No discharge, odor.   Trichomonas was found on her recent pap smear. She was treated with flagyl.   Partner was treated as well.   Would like testing to confirm resolution.     Problem list and histories reviewed & adjusted, as indicated.  Additional history: as documented    There is no problem list on file for this patient.    Past Surgical History:   Procedure Laterality Date     APPENDECTOMY  2016     CHOLECYSTECTOMY      gall bladder removed     GYN SURGERY       X 4       Social History   Substance Use Topics     Smoking status: Current Every Day Smoker     Packs/day: 0.30     Years: 20.00     Types: Cigarettes     Start date: 3/13/1997     Smokeless tobacco: Never Used     Alcohol use No     Family History   Problem Relation Age of Onset     DIABETES Mother      Thyroid Disease Mother      Hypertension Mother      DIABETES Father      Hypertension Father              Reviewed and updated as needed this visit by clinical staffTobacco  Allergies  Meds  Med Hx  Surg Hx  Fam Hx  Soc Hx      Reviewed and updated as needed this visit by Provider         ROS:  Constitutional, HEENT, cardiovascular, pulmonary, gi and gu systems are negative, except as otherwise noted.      OBJECTIVE:   /75 (BP Location: Right arm, Patient Position: Chair, Cuff Size: Adult Regular)  Pulse 67  Temp 98  F (36.7  C) (Oral)  Ht 5' 6.53\" (1.69 m)  Wt 165 lb 12.8 oz (75.2 kg)  LMP 2017 (Exact Date)  SpO2 99%  BMI 26.33 kg/m2  Body mass index is 26.33 kg/(m^2).  GENERAL: healthy, alert and no distress    Diagnostic Test Results:  none     ASSESSMENT/PLAN:       ICD-10-CM    1. Trichomonal infection A59.9 Trichomonas vaginalis DNA PCR   Await " results, was adequately treated so suspect infection has resolved.     FUTURE APPOINTMENTS:       - Follow-up for annual visit or as needed    Eli Alvarado PA-C  [facility]

## 2018-01-02 NOTE — LETTER
[facility]  [address] [place]  [address]  [phone]        January 3, 2018    Traci Weiner  [address]  [address]  [facility] [address]        Dear Traci,    Your trichomonas culture was NEGATIVE.     Results for orders placed or performed in visit on 01/02/18   Trichomonas vaginalis DNA PCR   Result Value Ref Range    Specimen Description Urine     Trichomonas vaginalis DNA PCR  NOTV^Not Detected: Trichomonas target DNA is not detected. All internal controls meet acceptance criteria.     Not Detected: Trichomonas target DNA is not detected. All internal controls meet   acceptance criteria.          If you have any questions please call the clinic at [phone].    Sincerely,    Eli GATES

## 2018-01-02 NOTE — NURSING NOTE
"Chief Complaint   Patient presents with     Other       Initial /75 (BP Location: Right arm, Patient Position: Chair, Cuff Size: Adult Regular)  Pulse 67  Temp 98  F (36.7  C) (Oral)  Ht 5' 6.53\" (1.69 m)  Wt 165 lb 12.8 oz (75.2 kg)  LMP 12/29/2017 (Exact Date)  SpO2 99%  BMI 26.33 kg/m2 Estimated body mass index is 26.33 kg/(m^2) as calculated from the following:    Height as of this encounter: 5' 6.53\" (1.69 m).    Weight as of this encounter: 165 lb 12.8 oz (75.2 kg).  Medication Reconciliation: complete   Sabrina See DOUGIE Nails      "

## 2018-01-02 NOTE — MR AVS SNAPSHOT
"              After Visit Summary   2018    Traci Weiner    MRN: [id number]           Patient Information     Date Of Birth          1980        Visit Information        Provider Department      2018 1:00 PM Eli Alvarado PA-C [facility]        Today's Diagnoses     Trichomonal infection    -  1       Follow-ups after your visit        Who to contact     If you have questions or need follow up information about today's clinic visit or your schedule please contact [facility] directly at [phone].  Normal or non-critical lab and imaging results will be communicated to you by [employer], letter or phone within 4 business days after the clinic has received the results. If you do not hear from us within 7 days, please contact the clinic through [employer] or phone. If you have a critical or abnormal lab result, we will notify you by phone as soon as possible.  Submit refill requests through [employer] or call your pharmacy and they will forward the refill request to us. Please allow 3 business days for your refill to be completed.          Additional Information About Your Visit        MyChart Information     [employer] lets you send messages to your doctor, view your test results, renew your prescriptions, schedule appointments and more. To sign up, go to [email or web address] . Click on \"Log in\" on the left side of the screen, which will take you to the Welcome page. Then click on \"Sign up Now\" on the right side of the page.     You will be asked to enter the access code listed below, as well as some personal information. Please follow the directions to create your username and password.     Your access code is: [email or web address]  Expires: 2018  1:07 PM     Your access code will  in 90 days. If you need help or a new code, please call your [facility] or [phone].        Care EveryWhere ID     This is your Care EveryWhere ID. This could be used by " "other organizations to access your [place] medical records  [id number]        Your Vitals Were     Pulse Temperature Height Last Period Pulse Oximetry BMI (Body Mass Index)    67 98  F (36.7  C) (Oral) 5' 6.53\" (1.69 m) 12/29/2017 (Exact Date) 99% 26.33 kg/m2       Blood Pressure from Last 3 Encounters:   01/02/18 113/75   11/02/17 100/58   10/24/17 97/67    Weight from Last 3 Encounters:   01/02/18 165 lb 12.8 oz (75.2 kg)   11/02/17 165 lb 9.6 oz (75.1 kg)   10/24/17 170 lb (77.1 kg)              We Performed the Following     Trichomonas vaginalis DNA PCR        Primary Care Provider Office Phone # Fax #    [facility] [phone]       [address] [place] [address]        Equal Access to Services     YA DUFFY : Hadii aad ku hadasho Soorlando, waaxda luqadaha, qaybta kaalmada adeegyada, emilia yuen . So [facility] [phone].    ATENCIÓN: Si habla español, tiene a romo disposición servicios gratuitos de asistencia lingüística. Llame al [phone].    We comply with applicable federal civil rights laws and [place] laws. We do not discriminate on the basis of race, color, national origin, age, disability, sex, sexual orientation, or gender identity.            Thank you!     Thank you for choosing [facility]  for your care. Our goal is always to provide you with excellent care. Hearing back from our patients is one way we can continue to improve our services. Please take a few minutes to complete the written survey that you may receive in the mail after your visit with us. Thank you!             Your Updated Medication List - Protect others around you: Learn how to safely use, store and throw away your medicines at www.disposemymeds.org.          This list is accurate as of: 1/2/18  1:07 PM.  Always use your most recent med list.                   Brand Name Dispense Instructions for use Diagnosis    " ABILIFY 2 MG tablet   Generic drug:  ARIPiprazole      Take 2 mg by mouth daily        TRAZODONE HCL PO

## 2018-01-08 ENCOUNTER — TELEPHONE (OUTPATIENT)
Dept: FAMILY MEDICINE | Facility: CLINIC | Age: 38
End: 2018-01-08

## 2018-01-08 NOTE — TELEPHONE ENCOUNTER
Reason for Call:  Request for results:    Name of test or procedure:urine test     Date of test of procedure: 1/2/18    Location of the test or procedure: [facility] to leave the result message on voice mail or with a family member? YES    Phone number Patient can be reached at:  Home number on file [phone] (home)    Additional comments: please call twice if phone says not in service and please leave results in a message if PT is unavailable     Call taken on 1/8/2018 at 1:06 PM by Katya Olivares

## 2018-01-08 NOTE — TELEPHONE ENCOUNTER
Notes Recorded by Eli Alvarado PA-C on 1/3/2018 at 6:39 AM  Your trichomonas culture was NEGATIVE.   Eli Alvarado PA-C    Attempt # 1  Called patient at home number.  Was call answered?  Yes, relayed above results - patient had no questions.    Carley Miller RN  [facility]

## 2018-09-23 ENCOUNTER — TRANSFERRED RECORDS (OUTPATIENT)
Dept: HEALTH INFORMATION MANAGEMENT | Facility: CLINIC | Age: 38
End: 2018-09-23

## 2019-07-11 ENCOUNTER — COMMUNICATION - HEALTHEAST (OUTPATIENT)
Dept: SCHEDULING | Facility: CLINIC | Age: 39
End: 2019-07-11

## 2019-08-09 ENCOUNTER — OFFICE VISIT (OUTPATIENT)
Dept: FAMILY MEDICINE | Facility: CLINIC | Age: 39
End: 2019-08-09
Payer: COMMERCIAL

## 2019-08-09 VITALS
WEIGHT: 176 LBS | DIASTOLIC BLOOD PRESSURE: 80 MMHG | HEART RATE: 60 BPM | HEIGHT: 67 IN | TEMPERATURE: 98.4 F | BODY MASS INDEX: 27.62 KG/M2 | SYSTOLIC BLOOD PRESSURE: 120 MMHG | OXYGEN SATURATION: 100 %

## 2019-08-09 DIAGNOSIS — F17.200 SMOKER: ICD-10-CM

## 2019-08-09 DIAGNOSIS — N94.6 DYSMENORRHEA: ICD-10-CM

## 2019-08-09 DIAGNOSIS — Z00.00 ROUTINE GENERAL MEDICAL EXAMINATION AT A HEALTH CARE FACILITY: Primary | ICD-10-CM

## 2019-08-09 DIAGNOSIS — F32.0 MILD MAJOR DEPRESSION (H): ICD-10-CM

## 2019-08-09 LAB
CHOLEST SERPL-MCNC: 123 MG/DL
GLUCOSE BLD-MCNC: 96 MG/DL (ref 70–99)
HDLC SERPL-MCNC: 41 MG/DL
LDLC SERPL CALC-MCNC: 73 MG/DL
NONHDLC SERPL-MCNC: 82 MG/DL
TRIGL SERPL-MCNC: 45 MG/DL
TSH SERPL DL<=0.005 MIU/L-ACNC: 1.49 MU/L (ref 0.4–4)

## 2019-08-09 PROCEDURE — 82947 ASSAY GLUCOSE BLOOD QUANT: CPT | Performed by: PHYSICIAN ASSISTANT

## 2019-08-09 PROCEDURE — 99213 OFFICE O/P EST LOW 20 MIN: CPT | Mod: 25 | Performed by: PHYSICIAN ASSISTANT

## 2019-08-09 PROCEDURE — 36415 COLL VENOUS BLD VENIPUNCTURE: CPT | Performed by: PHYSICIAN ASSISTANT

## 2019-08-09 PROCEDURE — 99396 PREV VISIT EST AGE 40-64: CPT | Performed by: PHYSICIAN ASSISTANT

## 2019-08-09 PROCEDURE — 84443 ASSAY THYROID STIM HORMONE: CPT | Performed by: PHYSICIAN ASSISTANT

## 2019-08-09 PROCEDURE — 80061 LIPID PANEL: CPT | Performed by: PHYSICIAN ASSISTANT

## 2019-08-09 RX ORDER — ARIPIPRAZOLE 2 MG/1
2 TABLET ORAL DAILY
Qty: 30 TABLET | Refills: 1 | Status: SHIPPED | OUTPATIENT
Start: 2019-08-09 | End: 2019-08-09

## 2019-08-09 RX ORDER — ARIPIPRAZOLE 2 MG/1
2 TABLET ORAL DAILY
Qty: 30 TABLET | Refills: 1 | Status: SHIPPED | OUTPATIENT
Start: 2019-08-09 | End: 2019-11-12

## 2019-08-09 ASSESSMENT — ENCOUNTER SYMPTOMS
SORE THROAT: 0
PARESTHESIAS: 0
BREAST MASS: 0
EYE PAIN: 0
ABDOMINAL PAIN: 0
HEARTBURN: 0
HEADACHES: 1
NERVOUS/ANXIOUS: 0
CONSTIPATION: 0
WEAKNESS: 0
ARTHRALGIAS: 0
MYALGIAS: 1
DIARRHEA: 0
NAUSEA: 0
CHILLS: 0
FREQUENCY: 1
HEMATOCHEZIA: 0
SHORTNESS OF BREATH: 1
DIZZINESS: 0
PALPITATIONS: 0
DYSURIA: 0
HEMATURIA: 0
JOINT SWELLING: 0
COUGH: 0
FEVER: 0

## 2019-08-09 ASSESSMENT — PATIENT HEALTH QUESTIONNAIRE - PHQ9
10. IF YOU CHECKED OFF ANY PROBLEMS, HOW DIFFICULT HAVE THESE PROBLEMS MADE IT FOR YOU TO DO YOUR WORK, TAKE CARE OF THINGS AT HOME, OR GET ALONG WITH OTHER PEOPLE: VERY DIFFICULT
SUM OF ALL RESPONSES TO PHQ QUESTIONS 1-9: 10
SUM OF ALL RESPONSES TO PHQ QUESTIONS 1-9: 10

## 2019-08-09 ASSESSMENT — MIFFLIN-ST. JEOR: SCORE: 1493.57

## 2019-08-09 NOTE — PATIENT INSTRUCTIONS
1. Take 800mg of ibuprofen every 8 hours with your menstrual cramps. Take with food.   2. Schedule with the OB/GYN's at the [facility].   3. They will call you to schedule with the therapist.   4. Start the abilify daily.   5. Follow up in about 5 weeks.       Preventive Health Recommendations  Female Ages 40 to 49    Yearly exam:     See your health care provider every year in order to  1. Review health changes.   2. Discuss preventive care.    3. Review your medicines if your doctor prescribed any.      Get a Pap test every three years (unless you have an abnormal result and your provider advises testing more often).      If you get Pap tests with HPV test, you only need to test every 5 years, unless you have an abnormal result. You do not need a Pap test if your uterus was removed (hysterectomy) and you have not had cancer.      You should be tested each year for STDs (sexually transmitted diseases), if you're at risk.     Ask your doctor if you should have a mammogram.      Have a colonoscopy (test for colon cancer) if someone in your family has had colon cancer or polyps before age 50.       Have a cholesterol test every 5 years.       Have a diabetes test (fasting glucose) after age 45. If you are at risk for diabetes, you should have this test every 3 years.    Shots: Get a flu shot each year. Get a tetanus shot every 10 years.     Nutrition:     Eat at least 5 servings of fruits and vegetables each day.    Eat whole-grain bread, whole-wheat pasta and brown rice instead of white grains and rice.    Get adequate Calcium and Vitamin D.      Lifestyle    Exercise at least 150 minutes a week (an average of 30 minutes a day, 5 days a week). This will help you control your weight and prevent disease.    Limit alcohol to one drink per day.    No smoking.     Wear sunscreen to prevent skin cancer.    See your dentist every six months for an exam and cleaning.

## 2019-08-09 NOTE — LETTER
[facility]   [address] [place]  [address]  [phone]                                   August 12, 2019    Traci Weiner  [address]   [facility] [address]        Dear Traci,    Your labs are all normal. No concerns.     Results for orders placed or performed in visit on 08/09/19   Lipid panel reflex to direct LDL Fasting   Result Value Ref Range    Cholesterol 123 <200 mg/dL    Triglycerides 45 <150 mg/dL    HDL Cholesterol 41 (L) >49 mg/dL    LDL Cholesterol Calculated 73 <100 mg/dL    Non HDL Cholesterol 82 <130 mg/dL   Glucose whole blood   Result Value Ref Range    Glucose Whole Blood 96 70 - 99 mg/dL   TSH with free T4 reflex   Result Value Ref Range    TSH 1.49 0.40 - 4.00 mU/L       If you have any questions please call the clinic at [phone]    Sincerely,    Eli Alvarado PA-C  [facility]

## 2019-08-09 NOTE — PROGRESS NOTES
"   SUBJECTIVE:   CC: Traci Weiner is an 39 year old woman who presents for preventive health visit.     Healthy Habits:     Getting at least 3 servings of Calcium per day:  Yes    Bi-annual eye exam:  Yes    Dental care twice a year:  NO    Sleep apnea or symptoms of sleep apnea:  None    Diet:  Regular (no restrictions)    Frequency of exercise:  None    Taking medications regularly:  Yes    Barriers to taking medications:  None    Medication side effects:  None    PHQ-2 Total Score: 4    Additional concerns today:  No    Her menses is giving her issues. Having severe pain where she doesn't want to get out of the bed. All days are painful  Her last one lasted almost a month. It was heavy every time she used the bathroom. Normally her periods are 5 days. Her period in June was normal.     Smoking a 1/2ppd. Patient trying to quit on her own.     Stopped taking her medications. Mood are \"ridiculous\" She notes she just flips out for no reason. Depression is severe.   Suicidal thoughts at times. No plans.   She had been on ability and trazodone. She notes they made her sleep a lot and she stopped because her youngest was really young.       Today's PHQ-2 Score:   PHQ-2 ( 1999 Pfizer) 8/9/2019   Q1: Little interest or pleasure in doing things 1   Q2: Feeling down, depressed or hopeless 3   PHQ-2 Score 4   Q1: Little interest or pleasure in doing things Several days   Q2: Feeling down, depressed or hopeless Nearly every day   PHQ-2 Score 4       Abuse: Current or Past(Physical, Sexual or Emotional)- No  Do you feel safe in your environment? Yes    Social History     Tobacco Use     Smoking status: Current Every Day Smoker     Packs/day: 0.30     Years: 20.00     Pack years: 6.00     Types: Cigarettes     Start date: 3/13/1997     Smokeless tobacco: Never Used   Substance Use Topics     Alcohol use: No         Alcohol Use 8/9/2019   Prescreen: >3 drinks/day or >7 drinks/week? No   Prescreen: >3 drinks/day or >7 " drinks/week? -       Reviewed orders with patient.  Reviewed health maintenance and updated orders accordingly - Yes  Labs reviewed in [facility]    Mammogram Screening: Patient under age 50, mutual decision reflected in health maintenance.      Pertinent mammograms are reviewed under the imaging tab.  History of abnormal Pap smear: NO - age 30-65 PAP every 5 years with negative HPV co-testing recommended  PAP / HPV Latest Ref Rng & Units 11/2/2017   PAP - NIL   HPV 16 DNA NEG:Negative Negative   HPV 18 DNA NEG:Negative Negative   OTHER HR HPV NEG:Negative Negative     Reviewed and updated as needed this visit by clinical staff  Tobacco  Allergies  Meds  Problems  Med Hx  Surg Hx  Fam Hx         Reviewed and updated as needed this visit by Provider  Tobacco  Allergies  Meds  Problems  Med Hx  Surg Hx  Fam Hx            Review of Systems   Constitutional: Negative for chills and fever.   HENT: Negative for congestion, ear pain, hearing loss and sore throat.    Eyes: Negative for pain and visual disturbance.   Respiratory: Positive for shortness of breath. Negative for cough.    Cardiovascular: Positive for chest pain. Negative for palpitations and peripheral edema.   Gastrointestinal: Negative for abdominal pain, constipation, diarrhea, heartburn, hematochezia and nausea.   Breasts:  Negative for tenderness, breast mass and discharge.   Genitourinary: Positive for frequency. Negative for dysuria, genital sores, hematuria, pelvic pain, urgency, vaginal bleeding and vaginal discharge.   Musculoskeletal: Positive for myalgias. Negative for arthralgias and joint swelling.   Skin: Negative for rash.   Neurological: Positive for headaches. Negative for dizziness, weakness and paresthesias.   Psychiatric/Behavioral: Positive for mood changes. The patient is not nervous/anxious.         OBJECTIVE:   /80 (BP Location: Right arm, Patient Position: Chair, Cuff Size: Adult Regular)   Pulse 60   Temp 98.4  F (36.9  " C) (Oral)   Ht 1.698 m (5' 6.85\")   Wt 79.8 kg (176 lb)   LMP 07/02/2019   SpO2 100%   Breastfeeding? No   BMI 27.69 kg/m    Physical Exam  GENERAL: healthy, alert and no distress  EYES: Eyes grossly normal to inspection, PERRL and conjunctivae and sclerae normal  HENT: ear canals and TM's normal, nose and mouth without ulcers or lesions  NECK: no adenopathy, no asymmetry, masses, or scars and thyroid normal to palpation  RESP: lungs clear to auscultation - no rales, rhonchi or wheezes  BREAST: normal without masses, tenderness or nipple discharge and no palpable axillary masses or adenopathy  CV: regular rate and rhythm, normal S1 S2, no S3 or S4, no murmur, click or rub, no peripheral edema and peripheral pulses strong  ABDOMEN: soft, nontender, no hepatosplenomegaly, no masses and bowel sounds normal  MS: no gross musculoskeletal defects noted, no edema  SKIN: no suspicious lesions or rashes  NEURO: Normal strength and tone, mentation intact and speech normal  PSYCH: mentation appears normal, affect normal/bright    Diagnostic Test Results:    ASSESSMENT/PLAN:   1. Routine general medical examination at a health care facility  - Lipid panel reflex to direct LDL Fasting  - Glucose whole blood    2. Dysmenorrhea  Referral to GYN to consider IUD vs ablation. Patient is a smoker, not a candidate for OCP  - TSH with free T4 reflex  - OB/GYN REFERRAL    3. Mild major depression (H)  Uncontrolled. Did well with abilify in the past. Will restart and have patient start with counselor. return to clinic in 5 weeks.   - MENTAL HEALTH REFERRAL  - Adult; Outpatient Treatment; Individual/Couples/Family/Group Therapy/Health Psychology; [facility]: [facility] [phone]; We will contact you to schedule the appointment or please call with any questions  - ARIPiprazole (ABILIFY) 2 MG tablet; Take 1 tablet (2 mg) by mouth daily  Dispense: 30 tablet; Refill: 1    4. Smoker  Encouraged cessation. " "      COUNSELING:  Reviewed preventive health counseling, as reflected in patient instructions       Regular exercise       Healthy diet/nutrition       Safe sex practices/STD prevention       HIV screeninx in teen years, 1x in adult years, and at intervals if high risk    Estimated body mass index is 27.69 kg/m  as calculated from the following:    Height as of this encounter: 1.698 m (5' 6.85\").    Weight as of this encounter: 79.8 kg (176 lb).    Weight management plan: Discussed healthy diet and exercise guidelines     reports that she has been smoking cigarettes.  She started smoking about 22 years ago. She has a 6.00 pack-year smoking history. She has never used smokeless tobacco.  Tobacco Cessation Action Plan: Information offered: Patient not interested at this time    Counseling Resources:  ATP IV Guidelines  Pooled Cohorts Equation Calculator  Breast Cancer Risk Calculator  FRAX Risk Assessment  ICSI Preventive Guidelines  Dietary Guidelines for Americans,   USDA's MyPlate  ASA Prophylaxis  Lung CA Screening    Eli Alvarado PA-C  [facility]  Answers for HPI/ROS submitted by the patient on 2019   Annual Exam:  If you checked off any problems, how difficult have these problems made it for you to do your work, take care of things at home, or get along with other people?: Very difficult  PHQ9 TOTAL SCORE: 10    "

## 2019-08-10 ASSESSMENT — PATIENT HEALTH QUESTIONNAIRE - PHQ9: SUM OF ALL RESPONSES TO PHQ QUESTIONS 1-9: 10

## 2019-08-20 ENCOUNTER — OFFICE VISIT (OUTPATIENT)
Dept: OBGYN | Facility: CLINIC | Age: 39
End: 2019-08-20
Payer: COMMERCIAL

## 2019-08-20 VITALS
BODY MASS INDEX: 28.66 KG/M2 | DIASTOLIC BLOOD PRESSURE: 76 MMHG | SYSTOLIC BLOOD PRESSURE: 117 MMHG | OXYGEN SATURATION: 100 % | HEIGHT: 67 IN | HEART RATE: 71 BPM | WEIGHT: 182.6 LBS

## 2019-08-20 DIAGNOSIS — Z12.39 SCREENING FOR BREAST CANCER: ICD-10-CM

## 2019-08-20 DIAGNOSIS — N92.1 METRORRHAGIA: ICD-10-CM

## 2019-08-20 DIAGNOSIS — Z98.891 PREVIOUS CESAREAN SECTION: ICD-10-CM

## 2019-08-20 DIAGNOSIS — N85.2 ENLARGED UTERUS: ICD-10-CM

## 2019-08-20 DIAGNOSIS — N94.6 DYSMENORRHEA: Primary | ICD-10-CM

## 2019-08-20 DIAGNOSIS — Z11.3 SCREEN FOR STD (SEXUALLY TRANSMITTED DISEASE): ICD-10-CM

## 2019-08-20 PROCEDURE — 99244 OFF/OP CNSLTJ NEW/EST MOD 40: CPT | Performed by: OBSTETRICS & GYNECOLOGY

## 2019-08-20 RX ORDER — NAPROXEN 500 MG/1
500 TABLET ORAL 2 TIMES DAILY WITH MEALS
Qty: 30 TABLET | Refills: 0 | Status: SHIPPED | OUTPATIENT
Start: 2019-08-20 | End: 2021-04-12

## 2019-08-20 ASSESSMENT — MIFFLIN-ST. JEOR: SCORE: 1523.51

## 2019-08-20 NOTE — PROGRESS NOTES
"Chief Complaint   Patient presents with     Abnormal Uterine Bleeding       Initial /76 (BP Location: Left arm, Patient Position: Sitting, Cuff Size: Adult Regular)   Pulse 71   Ht 1.698 m (5' 6.85\")   Wt 82.8 kg (182 lb 9.6 oz)   LMP 2019   SpO2 100%   Breastfeeding? No   BMI 28.73 kg/m   Estimated body mass index is 28.73 kg/m  as calculated from the following:    Height as of this encounter: 1.698 m (5' 6.85\").    Weight as of this encounter: 82.8 kg (182 lb 9.6 oz).  BP completed using cuff size: regular    Questioned patient about current smoking habits.  Pt. currently smokes.  Advised about smoking cessation.          The following HM Due: mammogram      The following patient reported/Care Every where data was sent to:  P ABSTRACT QUALITY INITIATIVES [36228]  n/a      patient has appointment for today and orders have been placed     I was asked to see Traci Weiner by Eli Alvarado for consultation regarding painful periods.      HPI:  Traci Weiner is a 39 year old female  here for a consultation regarding:      Periods so bad she can't get any sleep because of the pain.  Tylenol, ibuprofen nothing helps.  Takes 800 mg ibuprofen last night and this morning.  My stomach feels like someone is inside me just pulling and pulling.   Mostly pain.  Bleeding does not bother her.  The pain \"kills me\"    Pain going on now for about one yr  Last month her period lasted a whole month.    Used to just be 5 days long and regular every month.   This period just started .      For couple months the bleeding has been very frequent.      Asked her primary provider about a hysterectomy and was referred here.    Stable relationship.  She had a tubal ligation done with last  section.   In a One yr relationship.    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    Smokes down to 1 cig a day     Patient's last menstrual period was 2019.         OB History    Para " Term  AB Living   4 4 4 0 0 4   SAB TAB Ectopic Multiple Live Births   0 0 0 0 4      # Outcome Date GA Lbr Erik/2nd Weight Sex Delivery Anes PTL Lv   4 Term 12 40w0d  2.551 kg (5 lb 10 oz) M CS-Unspec   KIMBER      Name: Dread   3 Term 01 40w0d  3.657 kg (8 lb 1 oz) M CS-Unspec   KIMBER      Name: Kahlil   2 Term 97 40w0d  3.827 kg (8 lb 7 oz) M CS-Unspec   KIMBER      Name: Remigio   1 Term 96 40w0d  3.402 kg (7 lb 8 oz) M CS-Unspec   KIMBER      Name: Nestor        Past GYN history:   Lab Results   Component Value Date    PAP NIL 2017     Social History     Tobacco Use     Smoking status: Current Some Day Smoker     Packs/day: 0.30     Years: 20.00     Pack years: 6.00     Types: Cigarettes     Start date: 3/13/1997     Smokeless tobacco: Never Used   Substance Use Topics     Alcohol use: No     Drug use: Yes     Types: Marijuana          Past Medical History:   Diagnosis Date     Depressive disorder        Past Surgical History:   Procedure Laterality Date     APPENDECTOMY  2016     CHOLECYSTECTOMY  2004    gall bladder removed     GYN SURGERY       X 4       Family History   Problem Relation Age of Onset     Diabetes Mother      Thyroid Disease Mother      Hypertension Mother      Diabetes Father      Hypertension Father          Medications:    Current Outpatient Medications:      ARIPiprazole (ABILIFY) 2 MG tablet, Take 1 tablet (2 mg) by mouth daily, Disp: 30 tablet, Rfl: 1     naproxen (NAPROSYN) 500 MG tablet, Take 1 tablet (500 mg) by mouth 2 times daily (with meals), Disp: 30 tablet, Rfl: 0    Allergies:  Morphine    ROS:  C: NEGATIVE for fever, chills, change in weight  I: NEGATIVE for worrisome rashes, moles or lesions  E: NEGATIVE for vision changes or irritation  E/M: NEGATIVE for ear, mouth and throat problems  R: NEGATIVE for significant cough or SOB  CV: NEGATIVE for chest pain, palpitations or peripheral edema  GI: NEGATIVE for nausea, abdominal pain,  "heartburn, or change in bowel habits, occ constipation and painful hemorrhoids.   : NEGATIVE for frequency, dysuria, hematuria, vaginal discharge, or irregular bleeding  M: NEGATIVE for significant arthralgias or myalgia  N: NEGATIVE for weakness, dizziness or paresthesias  E: NEGATIVE for temperature intolerance, skin/hair changes  P: positive for changes in mood or affect -- anxiety, stress and depression      EXAM:  /76 (BP Location: Left arm, Patient Position: Sitting, Cuff Size: Adult Regular)   Pulse 71   Ht 1.698 m (5' 6.85\")   Wt 82.8 kg (182 lb 9.6 oz)   LMP 2019   SpO2 100%   Breastfeeding? No   BMI 28.73 kg/m      General - pleasant female in no acute distress.  Neurological - Alert and oriented  Psych:  normal mood and affect.  normal respiratory and cardiovascular effort   Breast - deferred.  Abdomen - soft, nontender, nondistended.  Musculoskeletal - no gross deformities.  Skin- no rashes seen        Pelvic:  EG - normal adult female.  BUS - within normal limits.  Vagina - well rugated, no discharge.  Cervix - no lesions, no CMT.  Uterus - firm, normal size and nontender.  Adnexae - no masses or tenderness.  RV - deferred.    ASSESSMENT:  Encounter Diagnosis     Encounter Diagnoses   Name Primary?     Dysmenorrhea Yes     Metrorrhagia      Previous  section      Enlarged uterus      Screening for breast cancer      Screen for STD (sexually transmitted disease)          PLAN:   genprobe  pelvic ultrasound   EMB after US  discussed management options. Consider IUD or endometrial ablation.  If not improvement then may need a hysterectomy.   rx for siena Armstrong MD        CC  Eli Arenas    "

## 2019-08-20 NOTE — Clinical Note
Casey Benitez,I saw your patient, Traci Weiner, recently. Here is a copy of my note. Thanks for the consult, Natalie

## 2019-08-23 ENCOUNTER — ANCILLARY PROCEDURE (OUTPATIENT)
Dept: ULTRASOUND IMAGING | Facility: CLINIC | Age: 39
End: 2019-08-23
Attending: OBSTETRICS & GYNECOLOGY
Payer: COMMERCIAL

## 2019-08-23 DIAGNOSIS — N94.6 DYSMENORRHEA: ICD-10-CM

## 2019-08-23 DIAGNOSIS — N92.1 METRORRHAGIA: ICD-10-CM

## 2019-08-23 PROCEDURE — 76856 US EXAM PELVIC COMPLETE: CPT

## 2019-08-23 PROCEDURE — 76830 TRANSVAGINAL US NON-OB: CPT

## 2019-08-28 ENCOUNTER — TELEPHONE (OUTPATIENT)
Dept: SCHEDULING | Facility: CLINIC | Age: 39
End: 2019-08-28

## 2019-08-28 ENCOUNTER — TELEPHONE (OUTPATIENT)
Dept: OBGYN | Facility: CLINIC | Age: 39
End: 2019-08-28

## 2019-08-28 NOTE — TELEPHONE ENCOUNTER
Patient calling regarding results from her appt with RR on 8/20. It appears that the G/C results are still future and were not run - will contact the [facility] lab to see what happened. Per [facility] lab, the orders were cancelled d/t incorrect specimen collected in wrong container.    RR - do you want to see patient back to collect at an OV. Or place orders and have her come in a leave a dirty urine sample as lab only?    Patient is also questioning the US results - has not been received by RR. Will send to her to review and can call patient back with plan.  Ligia Jiang RN

## 2019-08-29 NOTE — TELEPHONE ENCOUNTER
Pt called today for results.  Discussed cancelled labs with her. She has appt 9/5/19 and will just get the labs done then.  Glenys Coronel RN

## 2019-09-12 DIAGNOSIS — Z11.3 SCREEN FOR STD (SEXUALLY TRANSMITTED DISEASE): ICD-10-CM

## 2019-09-12 PROCEDURE — 87491 CHLMYD TRACH DNA AMP PROBE: CPT | Performed by: OBSTETRICS & GYNECOLOGY

## 2019-09-12 PROCEDURE — 87591 N.GONORRHOEAE DNA AMP PROB: CPT | Performed by: OBSTETRICS & GYNECOLOGY

## 2019-09-13 ENCOUNTER — TELEPHONE (OUTPATIENT)
Dept: FAMILY MEDICINE | Facility: CLINIC | Age: 39
End: 2019-09-13

## 2019-09-13 NOTE — TELEPHONE ENCOUNTER
Reason for Call:  Request for results:    Name of test or procedure: std testing    Date of test of procedure: 9/12/19    Location of the test or procedure: [facility] to leave the result message on voice mail or with a family member? Not Applicable    Phone number Patient can be reached at:  Home number on file [phone] (home)    Additional comments: Anytime    Call taken on 9/13/2019 at 3:31 PM by Farhana Estrada

## 2019-09-13 NOTE — TELEPHONE ENCOUNTER
Testing was actually ordered by OB.  I called and spoke to patient, advised her of negative STD/normal tests.    Advised her the results with go to Dr. Armstrong who ordered them and she will contact her with any further concerns.    Patient verbalized understanding of and agreement with plan.    Monica Mark RN  [facility]

## 2019-09-17 ENCOUNTER — TELEPHONE (OUTPATIENT)
Dept: OBGYN | Facility: CLINIC | Age: 39
End: 2019-09-17

## 2019-09-17 NOTE — TELEPHONE ENCOUNTER
TC to patient per result note on 9/16. Discussed US results and need for an appointment. Pt stated understanding. Attempted scheduling pt while I had her on the phone and first available is not until 10/29 at [facility] or 10/23 at [facility]. Pt asked to be seen sooner than that. I did offer an appt on 10/17 (using a prenatal spot), but still wants to be seen sooner. Is there a day that you could fit her in on your schedule or a call day that you could see her? Thanks.   Karena [place], RN-BSN   19

## 2019-09-18 NOTE — TELEPHONE ENCOUNTER
Can you offer to schedule her on wed 9/25 when I am on call.  She will need 30 min appt.    Can put her on for ~ 1:00? Just make sure she can be flexible if we have to change the time and she should call first.

## 2019-09-18 NOTE — TELEPHONE ENCOUNTER
Pt called back. Scheduled on 9/25 at 1pm. Aware may not be seen exactly on time and will call an hour prior to appt.  Karena Montana RN-BSN

## 2019-09-20 ENCOUNTER — TELEPHONE (OUTPATIENT)
Dept: FAMILY MEDICINE | Facility: CLINIC | Age: 39
End: 2019-09-20

## 2019-09-20 NOTE — TELEPHONE ENCOUNTER
Notified patient that she has one more refill at [facility].  She will have this transferred to [facility].    Shelly Pulido RN,BSN  [facility]

## 2019-09-20 NOTE — TELEPHONE ENCOUNTER
Reason for call:  Other   Patient called regarding (reason for call): prescription  Additional comments: Patient is requesting refill for prescription: ARIPiprazole (ABILIFY) 2 MG tablet. Has one pill left at this time.   [facility]  Phone number to reach patient:  Home number on file [phone] (home)    Best Time:  anytime    Can we leave a detailed message on this number?  YES

## 2019-09-25 ENCOUNTER — OFFICE VISIT (OUTPATIENT)
Dept: OBGYN | Facility: CLINIC | Age: 39
End: 2019-09-25
Payer: COMMERCIAL

## 2019-09-25 VITALS
TEMPERATURE: 98.1 F | DIASTOLIC BLOOD PRESSURE: 69 MMHG | OXYGEN SATURATION: 99 % | WEIGHT: 179.5 LBS | SYSTOLIC BLOOD PRESSURE: 102 MMHG | BODY MASS INDEX: 28.17 KG/M2 | HEART RATE: 71 BPM | HEIGHT: 67 IN

## 2019-09-25 DIAGNOSIS — N94.6 DYSMENORRHEA: ICD-10-CM

## 2019-09-25 DIAGNOSIS — E61.1 IRON DEFICIENCY: ICD-10-CM

## 2019-09-25 DIAGNOSIS — N83.292 OTHER OVARIAN CYST, LEFT SIDE: ICD-10-CM

## 2019-09-25 DIAGNOSIS — N92.1 MENOMETRORRHAGIA: Primary | ICD-10-CM

## 2019-09-25 DIAGNOSIS — Z12.39 SCREENING FOR BREAST CANCER: ICD-10-CM

## 2019-09-25 LAB
ERYTHROCYTE [DISTWIDTH] IN BLOOD BY AUTOMATED COUNT: 13.3 % (ref 10–15)
HCT VFR BLD AUTO: 37.6 % (ref 35–47)
HGB BLD-MCNC: 12 G/DL (ref 11.7–15.7)
MCH RBC QN AUTO: 29 PG (ref 26.5–33)
MCHC RBC AUTO-ENTMCNC: 31.9 G/DL (ref 31.5–36.5)
MCV RBC AUTO: 91 FL (ref 78–100)
PLATELET # BLD AUTO: 114 10E9/L (ref 150–450)
RBC # BLD AUTO: 4.14 10E12/L (ref 3.8–5.2)
WBC # BLD AUTO: 8.1 10E9/L (ref 4–11)

## 2019-09-25 PROCEDURE — 36415 COLL VENOUS BLD VENIPUNCTURE: CPT | Performed by: OBSTETRICS & GYNECOLOGY

## 2019-09-25 PROCEDURE — 83540 ASSAY OF IRON: CPT | Performed by: OBSTETRICS & GYNECOLOGY

## 2019-09-25 PROCEDURE — 99214 OFFICE O/P EST MOD 30 MIN: CPT | Performed by: OBSTETRICS & GYNECOLOGY

## 2019-09-25 PROCEDURE — 83550 IRON BINDING TEST: CPT | Performed by: OBSTETRICS & GYNECOLOGY

## 2019-09-25 PROCEDURE — 85027 COMPLETE CBC AUTOMATED: CPT | Performed by: OBSTETRICS & GYNECOLOGY

## 2019-09-25 PROCEDURE — 82728 ASSAY OF FERRITIN: CPT | Performed by: OBSTETRICS & GYNECOLOGY

## 2019-09-25 ASSESSMENT — MIFFLIN-ST. JEOR: SCORE: 1509.45

## 2019-09-25 NOTE — PROGRESS NOTES
"Chief Complaint   Patient presents with     Consult     Ultrasound       Initial /69 (BP Location: Left arm, Patient Position: Sitting, Cuff Size: Adult Regular)   Pulse 71   Temp 98.1  F (36.7  C) (Oral)   Ht 1.698 m (5' 6.85\")   Wt 81.4 kg (179 lb 8 oz)   LMP 2019   SpO2 99%   Breastfeeding? No   BMI 28.24 kg/m   Estimated body mass index is 28.24 kg/m  as calculated from the following:    Height as of this encounter: 1.698 m (5' 6.85\").    Weight as of this encounter: 81.4 kg (179 lb 8 oz).  BP completed using cuff size: regular    Questioned patient about current smoking habits.  Pt. has never smoked.          The following HM Due: mammogram      The following patient reported/Care Every where data was sent to:  P ABSTRACT QUALITY INITIATIVES [[id number]]  n/a      patient has appointment for today and orders have been placed       HPI:  Traci Weiner is a 39 year old female here for a F/u today on heavy bleeding, recent ultrasound results    Patient's last menstrual period was 2019.  Bled for 5 days with the last 2 days extremely heavy, had to change pad q hour.   Periods are painful.  She feels tired.  Not taking iron pills.        PELVIC ULTRASOUND WITH ENDOVAGINAL TRANSDUCER  2019 1:54 PM      HISTORY: Dysmenorrhea. Metrorrhagia.     TECHNIQUE:  Endovaginal sonography was added to the transabdominal  exam.     COMPARISON: None.     FINDINGS:   Endometrium: Endometrium is 13 mm thick. Small fluid at the lower  uterine segment endometrium.     Uterus: Measures 10 x 3.5 x 5.3 cm. No focal myometrial lesion.     Right ovary: Normal measuring 3.9 x 3 x 1.9 cm.     Left ovary: Complex cyst is 1.9 cm. Otherwise unremarkable left ovary  that is 3.4 x 3.7 x 2.7 cm.     Additional findings: Trace free pelvic fluid.                                                                      IMPRESSION:    1. Complex cyst at the left ovary is 1.9 cm. Suggest follow-up pelvic  ultrasound in " "6-8 weeks.  2. Endometrium is normal in size. Trace fluid at the lower uterine  segment endometrium.  3. Trace free pelvic fluid.       OB Hx:     section x 4 , 4 boys     Past GYN history:   Lab Results   Component Value Date    PAP NIL 2017           Past Medical History:   Diagnosis Date     Depressive disorder        Past Surgical History:   Procedure Laterality Date     APPENDECTOMY  2016     CHOLECYSTECTOMY  2004    gall bladder removed     GYN SURGERY       X 4       Family History   Problem Relation Age of Onset     Diabetes Mother      Thyroid Disease Mother      Hypertension Mother      Diabetes Father      Hypertension Father          Medications:    Current Outpatient Medications:      ARIPiprazole (ABILIFY) 2 MG tablet, Take 1 tablet (2 mg) by mouth daily, Disp: 30 tablet, Rfl: 1     naproxen (NAPROSYN) 500 MG tablet, Take 1 tablet (500 mg) by mouth 2 times daily (with meals), Disp: 30 tablet, Rfl: 0    Allergies:  Morphine    EXAM:  Blood pressure 102/69, pulse 71, temperature 98.1  F (36.7  C), temperature source Oral, height 1.698 m (5' 6.85\"), weight 81.4 kg (179 lb 8 oz), last menstrual period 2019, SpO2 99 %, not currently breastfeeding.  BMI= Body mass index is 28.24 kg/m .  Patient's last menstrual period was 2019.  General - pleasant female in no acute distress.  Neurological - alert and oriented X 3  Psychiatric - normal mood and affect    No other physical examination was performed today as we spent over 50% of today's 20 minute visit in face-to-face discussion and counseling about issues noted in the ASSESSMENT.    ASSESSMENT:  Encounter Diagnoses   Name Primary?     Menometrorrhagia Yes     Dysmenorrhea      Other ovarian cyst, left side      Screening for breast cancer      Iron deficiency       (N92.1) Menometrorrhagia  (primary encounter diagnosis)  Comment:    Plan: CBC with platelets, Ferritin, Iron and iron         binding capacity, " Kendra-Operative Worksheet,         Case Request: dilation of the cervix,         NovaSure endometrial ablation, possible Mirena         IUD insertion         (N94.6) Dysmenorrhea  Comment: will likely improve with ablation       (N83.292) Other ovarian cyst, left side  Comment: Likely physiologic    (Z12.31) Screening for breast cancer  Comment:   Plan: MA Screening Digital Bilateral          (E61.1) Iron deficiency  Comment:    Plan: ferrous gluconate (FERGON) 324 (38 Fe) MG         tablet, Case Request:   dilation of the cervix,        NovaSure endometrial ablation, possible Mirena         IUD insertion        Discussion with the patient regarding the differential diagnosis of dysfunctional uterine bleeding, including  fibroids, adenomyosis, polyps, hyperplasia, cancer or endocrine disorders   Fibroids have been ruled out with the US but need to r/o hyperplasia with an endometrial biopsy. Patient declined procedure today .  Will come back for that.  If the biopsy is normal, we reviewed  management options at length     surgical:  endometrial ablation +/- mirena  Hysterectomy    or medical:  Mirena  OCP's ----> not option due to tobacco use  Progesterone     Patient would like to proceed with an endometrial ablation and possible Mirena insertion if her biopsy is normal.      Natalie Armstrong MD

## 2019-09-26 LAB
FERRITIN SERPL-MCNC: 7 NG/ML (ref 12–150)
IRON SATN MFR SERPL: 14 % (ref 15–46)
IRON SERPL-MCNC: 49 UG/DL (ref 35–180)
TIBC SERPL-MCNC: 354 UG/DL (ref 240–430)

## 2019-09-26 RX ORDER — FERROUS GLUCONATE 324(38)MG
324 TABLET ORAL
Qty: 60 TABLET | Refills: 3 | Status: ON HOLD | OUTPATIENT
Start: 2019-09-26 | End: 2020-01-30

## 2019-09-30 ENCOUNTER — TELEPHONE (OUTPATIENT)
Dept: OBGYN | Facility: CLINIC | Age: 39
End: 2019-09-30

## 2019-09-30 NOTE — TELEPHONE ENCOUNTER
Pt states she was told to make a f/u appointment for a procedure but does not know what. Please advise.    Pt cna be reached @ [phone] emily

## 2019-10-02 ENCOUNTER — HEALTH MAINTENANCE LETTER (OUTPATIENT)
Age: 39
End: 2019-10-02

## 2019-10-03 ENCOUNTER — PREP FOR PROCEDURE (OUTPATIENT)
Dept: OBGYN | Facility: CLINIC | Age: 39
End: 2019-10-03

## 2019-10-03 DIAGNOSIS — N92.0 HEAVY MENSTRUAL BLEEDING: Primary | ICD-10-CM

## 2019-10-03 NOTE — TELEPHONE ENCOUNTER
TC to patient. Scheduled appt for 10/30 for EMB. Pt stated that RR wanted procedure done 2 weeks prior to her having surgery. There are no surgery orders in patient's chart.   Karena Montana RN-BSN

## 2019-10-08 ENCOUNTER — HOSPITAL ENCOUNTER (OUTPATIENT)
Facility: CLINIC | Age: 39
End: 2019-10-08
Attending: OBSTETRICS & GYNECOLOGY | Admitting: OBSTETRICS & GYNECOLOGY
Payer: COMMERCIAL

## 2019-10-08 DIAGNOSIS — N92.0 HEAVY MENSTRUAL BLEEDING: ICD-10-CM

## 2019-10-08 NOTE — TELEPHONE ENCOUNTER
Type of surgery: gyn  Location of surgery: [facility]  Date and time of surgery: 12/5/19 730a  Surgeon: Nathaniel  Pre-Op Appt Date: tbd  Post-Op Appt Date: tbd   Packet sent out: Yes  Pre-cert/Authorization completed:  No  Date: 10/8/2019    Anya FAYE, Surgery Coordinator

## 2019-11-12 DIAGNOSIS — F32.0 MILD MAJOR DEPRESSION (H): ICD-10-CM

## 2019-11-12 NOTE — TELEPHONE ENCOUNTER
Requested Prescriptions   Pending Prescriptions Disp Refills     ARIPiprazole (ABILIFY) 2 MG tablet [Pharmacy Med Name: ARIPIPRAZOLE 2MG TABLETS] 30 tablet 0     Sig: TAKE 1 TABLET BY MOUTH EVERY DAY   Last Written Prescription Date:  8/9/19  Last Fill Quantity: 30,  # refills: 1   Last office visit: 8/9/2019 with prescribing provider:     Future Office Visit:   Next 5 appointments (look out 90 days)    Nov 27, 2019  3:30 PM CST  Office Visit with Natalie Armstrong MD  [facility] ([facility]) [address] [place] [id number] [phone]             Antipsychotic Medications Failed - 11/12/2019 12:29 PM        Failed - A1c or Glucose on file in past 12 months     No lab results found.    Please review patients last 3 weights. If a weight gain of >10 lbs exists, you may refill the prescription once after instructing the patient to schedule an appointment within the next 30 days.    Wt Readings from Last 3 Encounters:   09/25/19 81.4 kg (179 lb 8 oz)   08/20/19 82.8 kg (182 lb 9.6 oz)   08/09/19 79.8 kg (176 lb)             Passed - Blood pressure under 140/90 in past 12 months     BP Readings from Last 3 Encounters:   09/25/19 102/69   08/20/19 117/76   08/09/19 120/80                 Passed - Patient is 12 years of age or older        Passed - Lipid panel on file within the past 12 months     Recent Labs   Lab Test 08/09/19  1045   CHOL 123   TRIG 45   HDL 41*   LDL 73   NHDL 82               Passed - CBC on file in past 12 months     Recent Labs   Lab Test 09/25/19  1416   WBC 8.1   RBC 4.14   HGB 12.0   HCT 37.6   *                 Passed - Heart Rate on file within past 12 months     Pulse Readings from Last 3 Encounters:   09/25/19 71   08/20/19 71   08/09/19 60               Passed - Medication is active on med list        Passed - Patient is not pregnant        Passed - No positve pregnancy test on file in past 12 months        Passed - Recent  "(6 mo) or future (30 days) visit within the authorizing provider's specialty     Patient had office visit in the last 6 months or has a visit in the next 30 days with authorizing provider or within the authorizing provider's specialty.  See \"Patient Info\" tab in inbasket, or \"Choose Columns\" in Meds & Orders section of the refill encounter.              "

## 2019-11-14 RX ORDER — ARIPIPRAZOLE 2 MG/1
TABLET ORAL
Qty: 30 TABLET | Refills: 0 | Status: SHIPPED | OUTPATIENT
Start: 2019-11-14 | End: 2019-12-30

## 2019-11-14 NOTE — TELEPHONE ENCOUNTER
Routing refill request to provider for review/approval because:  Labs not current  Shelly Pulido, RN,BSN  [facility]

## 2019-11-25 NOTE — PROGRESS NOTES
"Chief Complaint   Patient presents with     Endometrial Biopsy       Initial /70 (BP Location: Left arm, Patient Position: Sitting, Cuff Size: Adult Regular)   Pulse 66   Temp 97.9  F (36.6  C) (Oral)   Ht 1.698 m (5' 6.85\")   Wt 80.3 kg (177 lb 1.6 oz)   LMP 2019   SpO2 98%   Breastfeeding No   BMI 27.86 kg/m   Estimated body mass index is 27.86 kg/m  as calculated from the following:    Height as of this encounter: 1.698 m (5' 6.85\").    Weight as of this encounter: 80.3 kg (177 lb 1.6 oz).  BP completed using cuff size: regular    Questioned patient about current smoking habits.  Pt. currently smokes.  Advised about smoking cessation.          The following HM Due: mammogram      The following patient reported/Care Every where data was sent to:  P ABSTRACT QUALITY INITIATIVES [[id number]]  n/a      patient has appointment for today and orders have been placed at last visit.        HPI:  Zev Weiner is a 39 year old female  here for an endometrial biopsy. She is scheduled for an endometrial ablation next week.     just finishing a period and it was extremely painful.   Took a vicodin from her mom which helped a little, because her naprosyn does not help at all.     Patient reports that her mother had a full hysterectomy after she gave birth to zev.     Patient's last menstrual period was 2019.           Past GYN history:   Lab Results   Component Value Date    PAP NIL 2017           Past Medical History:   Diagnosis Date     Depressive disorder        Past Surgical History:   Procedure Laterality Date     APPENDECTOMY  2016     CHOLECYSTECTOMY  2004    gall bladder removed     GYN SURGERY       X 4       Family History   Problem Relation Age of Onset     Diabetes Mother      Thyroid Disease Mother      Hypertension Mother      Diabetes Father      Hypertension Father          Medications:    Current Outpatient Medications:      ARIPiprazole (ABILIFY) " "2 MG tablet, TAKE 1 TABLET BY MOUTH EVERY DAY, Disp: 30 tablet, Rfl: 0     ferrous gluconate (FERGON) 324 (38 Fe) MG tablet, Take 1 tablet (324 mg) by mouth daily (with breakfast), Disp: 60 tablet, Rfl: 3     naproxen (NAPROSYN) 500 MG tablet, Take 1 tablet (500 mg) by mouth 2 times daily (with meals), Disp: 30 tablet, Rfl: 0    Allergies:  Morphine    ROS:  She denies abnormal vaginal bleeding, discharge or unusual pelvic pain, no dysuria, frequency or hematuria.    EXAM:  /70 (BP Location: Left arm, Patient Position: Sitting, Cuff Size: Adult Regular)   Pulse 66   Temp 97.9  F (36.6  C) (Oral)   Ht 1.698 m (5' 6.85\")   Wt 80.3 kg (177 lb 1.6 oz)   LMP 11/20/2019   SpO2 98%   Breastfeeding No   BMI 27.86 kg/m      General - tired female in no acute distress.  Neurological - Alert and oriented  Psych:  Anxious  normal respiratory and cardiovascular effort   Breast - deferred.  Abdomen - soft, nontender, nondistended.  Musculoskeletal - no gross deformities.  Skin- no rashes seen        Pelvic:  EG - normal adult female.  BUS - within normal limits.  Vagina - well rugated, no discharge.  Cervix - no lesions, tender.  Uterus - 9 wk size and very tender on exam. Feels scarred to anterior abdominal wall.     Adnexae - no masses or tenderness.  RV - deferred.    PROCEDURE:    After obtaining consent from the patient, an endometrial biopsy was performed.  A bimanual exam was first done.  The cervix was prepped with betadine and a tenaculum placed on the cervix.  The cervical os was dilated with a small dilator and the uterus sounded to 9 cm.  The pipelle was inserted with some resistance. The first pass was just blood, second pass was performed.   A moderate amount of tissue was obtained and sent to pathology.  She tolerated the procedure with A LOT of pain.  No complications.        ASSESSMENT:  Encounter Diagnoses   Name Primary?     Encounter for biopsy Yes     Dysmenorrhea      Menometrorrhagia     "     PLAN:   We reviewed management options again today.  We discussed the option of NovaSure endometrial ablation with or without the Mirena IUD insertion at the time of the procedure.  We also discussed the option of a total hysterectomy which would need to be done through laparotomy given her 4 prior C-sections.   We discussed this would be the most definitive option.   Especially considering her significant uterine tenderness on exam and the severe pain she is experiencing with her periods.  Discussed recovery is much longer for hysterectomy than an ablation, however hysterectomy would be more definitive and guarantee resolution of her periods.  Patient is requesting to proceed with a hysterectomy, for more definitive treatment.  She has already had a tubal ligation so sterilization is not an issue.  Discussed route of surgery, recovery, risks of bleeding, infection, and injury to bowel and bladder.  At this point she would like to go ahead and cancel her ablation and be rescheduled for a full hysterectomy.    Endometrial biopsy was done and sent to pathology today.    Natalie Armstrong MD

## 2019-11-26 DIAGNOSIS — Z01.818 PRE-OP EXAM: Primary | ICD-10-CM

## 2019-11-26 DIAGNOSIS — N92.0 MENORRHAGIA WITH REGULAR CYCLE: ICD-10-CM

## 2019-11-27 ENCOUNTER — OFFICE VISIT (OUTPATIENT)
Dept: OBGYN | Facility: CLINIC | Age: 39
End: 2019-11-27
Payer: COMMERCIAL

## 2019-11-27 VITALS
OXYGEN SATURATION: 98 % | SYSTOLIC BLOOD PRESSURE: 110 MMHG | HEIGHT: 67 IN | WEIGHT: 177.1 LBS | TEMPERATURE: 97.9 F | DIASTOLIC BLOOD PRESSURE: 70 MMHG | BODY MASS INDEX: 27.8 KG/M2 | HEART RATE: 66 BPM

## 2019-11-27 DIAGNOSIS — N92.1 MENOMETRORRHAGIA: Primary | ICD-10-CM

## 2019-11-27 DIAGNOSIS — Z76.89 ENCOUNTER FOR BIOPSY: ICD-10-CM

## 2019-11-27 DIAGNOSIS — N94.6 DYSMENORRHEA: ICD-10-CM

## 2019-11-27 LAB — HCG UR QL: NEGATIVE

## 2019-11-27 PROCEDURE — 99212 OFFICE O/P EST SF 10 MIN: CPT | Mod: 25 | Performed by: OBSTETRICS & GYNECOLOGY

## 2019-11-27 PROCEDURE — 88305 TISSUE EXAM BY PATHOLOGIST: CPT | Mod: 26 | Performed by: OBSTETRICS & GYNECOLOGY

## 2019-11-27 PROCEDURE — 58100 BIOPSY OF UTERUS LINING: CPT | Performed by: OBSTETRICS & GYNECOLOGY

## 2019-11-27 PROCEDURE — 88305 TISSUE EXAM BY PATHOLOGIST: CPT | Performed by: OBSTETRICS & GYNECOLOGY

## 2019-11-27 PROCEDURE — 81025 URINE PREGNANCY TEST: CPT | Performed by: OBSTETRICS & GYNECOLOGY

## 2019-11-27 ASSESSMENT — MIFFLIN-ST. JEOR: SCORE: 1498.56

## 2019-11-27 NOTE — PATIENT INSTRUCTIONS
Endometrial Biopsy  Post-Procedure Patient Instructions      Please monitor for any of the following:      Fever   Cramping after 48 hours   Bleeding for 24-48 hours that is heavier than a normal period   Any bleeding that soaks more than 1 pad per hour      Please call your health care provider if you develop any of the above symptoms or problems.     [facility]   Nurse Triage Line [phone]      Use pads, not tampons, for the bleeding. You may resume sexual relations in 2-3 days after bleeding has stopped.

## 2019-12-02 ENCOUNTER — TELEPHONE (OUTPATIENT)
Dept: OBGYN | Facility: CLINIC | Age: 39
End: 2019-12-02

## 2019-12-02 NOTE — TELEPHONE ENCOUNTER
Patient calling to update RR on what she would like to do hysterectomy. Will send message to RR to update her to put in new surgery orders. Patient would like to have the hysterectomy on 12/5 in place of the ablation if possible. Made patient aware that new surgery orders will need to be placed and there may not be time in the OR to have the hysterectomy in place of ablation, but will send an FYI to surgery scheduler as well she is aware.  Ligia Garcia RN     Elevated BG to 472 on admission in setting of h/o DM and sepsis, downtrending s/p NS 2L, low SS  - c/w corrective low dose SS  - HbA1C= 7.7 Likely exacerbated by sepsis  Impoved  Started Lantus 3U hs today & will continue ISS

## 2019-12-03 LAB — COPATH REPORT: NORMAL

## 2019-12-30 ENCOUNTER — OFFICE VISIT (OUTPATIENT)
Dept: FAMILY MEDICINE | Facility: CLINIC | Age: 39
End: 2019-12-30
Payer: COMMERCIAL

## 2019-12-30 VITALS
HEIGHT: 67 IN | TEMPERATURE: 98 F | DIASTOLIC BLOOD PRESSURE: 80 MMHG | HEART RATE: 82 BPM | WEIGHT: 179 LBS | BODY MASS INDEX: 28.09 KG/M2 | SYSTOLIC BLOOD PRESSURE: 111 MMHG

## 2019-12-30 DIAGNOSIS — F32.0 MILD MAJOR DEPRESSION (H): ICD-10-CM

## 2019-12-30 PROCEDURE — 99213 OFFICE O/P EST LOW 20 MIN: CPT | Performed by: PHYSICIAN ASSISTANT

## 2019-12-30 RX ORDER — ARIPIPRAZOLE 2 MG/1
2 TABLET ORAL DAILY
Qty: 90 TABLET | Refills: 0 | Status: SHIPPED | OUTPATIENT
Start: 2019-12-30 | End: 2021-04-12

## 2019-12-30 ASSESSMENT — PATIENT HEALTH QUESTIONNAIRE - PHQ9: SUM OF ALL RESPONSES TO PHQ QUESTIONS 1-9: 21

## 2019-12-30 ASSESSMENT — MIFFLIN-ST. JEOR: SCORE: 1507.18

## 2019-12-30 NOTE — PROGRESS NOTES
Subjective     Traci Weiner is a 39 year old female who presents to clinic today for the following health issues:    HPI   Depression Followup    How are you doing with your depression since your last visit? Worsened     Are you having other symptoms that might be associated with depression? No    Have you had a significant life event?  No     Are you feeling anxious or having panic attacks?   No    Do you have any concerns with your use of alcohol or other drugs? No    Social History     Tobacco Use     Smoking status: Current Some Day Smoker     Packs/day: 0.30     Years: 20.00     Pack years: 6.00     Types: Cigarettes     Start date: 3/13/1997     Smokeless tobacco: Never Used   Substance Use Topics     Alcohol use: No     Drug use: Yes     Types: Marijuana     PHQ 11/2/2017 8/9/2019   PHQ-9 Total Score 22 10   Q9: Thoughts of better off dead/self-harm past 2 weeks Not at all Not at all     No flowsheet data found.  Last PHQ-9 12/30/2019   1.  Little interest or pleasure in doing things 3   2.  Feeling down, depressed, or hopeless 3   3.  Trouble falling or staying asleep, or sleeping too much 2   4.  Feeling tired or having little energy 3   5.  Poor appetite or overeating 3   6.  Feeling bad about yourself 2   7.  Trouble concentrating 3   8.  Moving slowly or restless 2   Q9: Thoughts of better off dead/self-harm past 2 weeks 0   PHQ-9 Total Score 21   Difficulty at work, home, or with people Extremely dIfficult     No flowsheet data found.      Suicide Assessment Five-step Evaluation and Treatment (SAFE-T)      How many servings of fruits and vegetables do you eat daily?  2-3    On average, how many sweetened beverages do you drink each day (Examples: soda, juice, sweet tea, etc.  Do NOT count diet or artificially sweetened beverages)?   1    How many days per week do you miss taking your medication? 0    Patient was last seen for her physical in August. At that time we started abilify. She was instructed  "to follow up in 5 weeks and set up counseling.     She has been taking the abilify consistently. It has been helping her sleep. Once per week, doesn't seem to be helping. Her mind is racing and she can't get to sleep. Most days mind is always racing.   Sees Dr. Escobar- psychologist once per week. Has been helpful. Next appointment 1/11/19.   No suicidal thoughts.         Reviewed and updated as needed this visit by Provider  Tobacco  Allergies  Meds  Problems  Med Hx  Surg Hx  Fam Hx         Review of Systems   ROS COMP: Constitutional, HEENT, cardiovascular, pulmonary, gi and gu systems are negative, except as otherwise noted.      Objective    /80 (BP Location: Left arm, Patient Position: Chair, Cuff Size: Adult Regular)   Pulse 82   Temp 98  F (36.7  C) (Oral)   Ht 1.698 m (5' 6.85\")   Wt 81.2 kg (179 lb)   Breastfeeding No   BMI 28.16 kg/m    Body mass index is 28.16 kg/m .  Physical Exam   GENERAL: healthy, alert and no distress  PSYCH: mentation appears normal, affect normal/bright    Diagnostic Test Results:  none         Assessment & Plan       ICD-10-CM    1. Mild major depression (H) F32.0 ARIPiprazole (ABILIFY) 2 MG tablet     sertraline (ZOLOFT) 50 MG tablet   Will continue the abilify. Patient is seeing a counselor.   Will add in zoloft once daily. Follow up in 4 weeks.            Return in about 4 weeks (around 1/27/2020) for Mood check.    Eli Alvarado PA-C  [facility]      "

## 2019-12-30 NOTE — LETTER
My Depression Action Plan  Name: Traci Weiner   Date of Birth 1980  Date: 12/30/2019    My doctor: Janell Montes [facility] [place]   My clinic: [facility] [address] [id number] [phone]          GREEN    ZONE   Good Control    What it looks like:     Things are going generally well. You have normal up s and down s. You may even feel depressed from time to time, but bad moods usually last less than a day.   What you need to do:  1. Continue to care for yourself (see self care plan)  2. Check your depression survival kit and update it as needed  3. Follow your physician s recommendations including any medication.  4. Do not stop taking medication unless you consult with your physician first.           YELLOW         ZONE Getting Worse    What it looks like:     Depression is starting to interfere with your life.     It may be hard to get out of bed; you may be starting to isolate yourself from others.    Symptoms of depression are starting to last most all day and this has happened for several days.     You may have suicidal thoughts but they are not constant.   What you need to do:     1. Call your care team, your response to treatment will improve if you keep your care team informed of your progress. Yellow periods are signs an adjustment may need to be made.     2. Continue your self-care, even if you have to fake it!    3. Talk to someone in your support network    4. Open up your depression survival kit           RED    ZONE Medical Alert - Get Help    What it looks like:     Depression is seriously interfering with your life.     You may experience these or other symptoms: You can t get out of bed most days, can t work or engage in other necessary activities, you have trouble taking care of basic hygiene, or basic responsibilities, thoughts of suicide or death that will not go away, self-injurious behavior.     What you  need to do:  1. Call your care team and request a same-day appointment. If they are not available (weekends or after hours) call your local crisis line, emergency room or 911.            Depression Self Care Plan / Survival Kit    Self-Care for Depression  Here s the deal. Your body and mind are really not as separate as most people think.  What you do and think affects how you feel and how you feel influences what you do and think. This means if you do things that people who feel good do, it will help you feel better.  Sometimes this is all it takes.  There is also a place for medication and therapy depending on how severe your depression is, so be sure to consult with your medical provider and/ or Behavioral Health Consultant if your symptoms are worsening or not improving.     In order to better manage my stress, I will:    Exercise  Get some form of exercise, every day. This will help reduce pain and release endorphins, the  feel good  chemicals in your brain. This is almost as good as taking antidepressants!  This is not the same as joining a gym and then never going! (they count on that by the way ) It can be as simple as just going for a walk or doing some gardening, anything that will get you moving.      Hygiene   Maintain good hygiene (Get out of bed in the morning, Make your bed, Brush your teeth, Take a shower, and Get dressed like you were going to work, even if you are unemployed).  If your clothes don't fit try to get ones that do.    Diet  I will strive to eat foods that are good for me, drink plenty of water, and avoid excessive sugar, caffeine, alcohol, and other mood-altering substances.  Some foods that are helpful in depression are: complex carbohydrates, B vitamins, flaxseed, fish or fish oil, fresh fruits and vegetables.    Psychotherapy  I agree to participate in Individual Therapy (if recommended).    Medication  If prescribed medications, I agree to take them.  Missing doses can result in  serious side effects.  I understand that drinking alcohol, or other illicit drug use, may cause potential side effects.  I will not stop my medication abruptly without first discussing it with my provider.    Staying Connected With Others  I will stay in touch with my friends, family members, and my primary care provider/team.    Use your imagination  Be creative.  We all have a creative side; it doesn t matter if it s oil painting, sand castles, or mud pies! This will also kick up the endorphins.    Witness Beauty  (AKA stop and smell the roses) Take a look outside, even in mid-winter. Notice colors, textures. Watch the squirrels and birds.     Service to others  Be of service to others.  There is always someone else in need.  By helping others we can  get out of ourselves  and remember the really important things.  This also provides opportunities for practicing all the other parts of the program.    Humor  Laugh and be silly!  Adjust your TV habits for less news and crime-drama and more comedy.    Control your stress  Try breathing deep, massage therapy, biofeedback, and meditation. Find time to relax each day.     My support system    Clinic Contact:  Phone number:    Contact 1:  Phone number:    Contact 2:  Phone number:    Sabianism/:  Phone number:    Therapist:  Phone number:    Local crisis center:    Phone number:    Other community support:  Phone number:

## 2020-01-01 DIAGNOSIS — N94.6 DYSMENORRHEA: ICD-10-CM

## 2020-01-01 DIAGNOSIS — N92.1 MENOMETRORRHAGIA: Primary | ICD-10-CM

## 2020-01-08 ENCOUNTER — TELEPHONE (OUTPATIENT)
Dept: FAMILY MEDICINE | Facility: CLINIC | Age: 40
End: 2020-01-08

## 2020-01-08 DIAGNOSIS — Z01.818 PRE-OP EXAM: Primary | ICD-10-CM

## 2020-01-08 DIAGNOSIS — N92.1 MENOMETRORRHAGIA: ICD-10-CM

## 2020-01-08 PROBLEM — N94.6 DYSMENORRHEA: Status: ACTIVE | Noted: 2019-09-25

## 2020-01-08 NOTE — TELEPHONE ENCOUNTER
Type of surgery: gyn  Location of surgery: [facility] OR  Date and time of surgery: 1/29/20 730a  Surgeon: Nathaniel  Pre-Op Appt Date: tbd, PCP to do  Post-Op Appt Date: tbd   Packet sent out: Yes  Pre-cert/Authorization completed:  No  Date: 1/8/2020    Anya FAYE, Surgery Coordinator

## 2020-01-08 NOTE — LETTER
January 31, 2020    Traci Weiner  [address]  [place] [address]      Dear Traci Weiner,     We have tried to contact you about your health, but have been unable to reach you.  Please call us as soon as possible so we can provide you with the best care possible.  We will continue to check in with you throughout the year to complete these items of care, if you are not able to complete these items at this time.  If you would like to complete the missing items for your care, please contact us at [phone].    We recommend the following:  -schedule a FOLLOWUP OFFICE APPOINTMENT with your provider for your depression.        Sincerely,     Your Care Team at [place]    YOSELIN/TAMIR

## 2020-01-08 NOTE — TELEPHONE ENCOUNTER
Panel Management Review      Patient has the following on her problem list:     Depression / Dysthymia review    Measure:  Needs PHQ-9 score of 4 or less during index window.  Administer PHQ-9 and if score is 5 or more, send encounter to provider for next steps.    5 - 7 month window range:     PHQ-9 SCORE 11/2/2017 8/9/2019 12/30/2019   PHQ-9 Total Score MyChart 22 (Severe depression) 10 (Moderate depression) -   PHQ-9 Total Score 22 10 21       If PHQ-9 recheck is 5 or more, route to provider for next steps.    Patient is due for:  PHQ9      Composite cancer screening  Chart review shows that this patient is due/due soon for the following None  Summary:    Patient is due/failing the following:   PHQ9    Action needed:   Patient needs to do PHQ9.    Type of outreach:    Sent letter.    Questions for provider review:    None                                                                                                                                    GAYATRI Fountain MA       Chart routed to Care Team .

## 2020-01-08 NOTE — LETTER
January 8, 2020    Traci Weiner  [address]  [place] [facility] [address]    Dear Traci    We care about your health and have reviewed your health plan. We have reviewed your medical conditions, medication list, and lab results and are making recommendations based on this review, to better manage your health.    You are in particular need of attention regarding:  - Your Depression      Here is a list of Health Maintenance topics that are due now or due soon:  Health Maintenance Due   Topic Date Due     PNEUMOCOCCAL IMMUNIZATION 19-64 MEDIUM RISK (1 of 1 - PPSV23) 07/10/1997       Please call us at [phone] (or use [employer]) to address the above recommendations. If we do not hear from you in the next couple of weeks we will be reaching out to you again.    Thank you for trusting [facility] and we appreciate the opportunity to serve you.  We look forward to supporting your healthcare needs in the future.    Healthy Regards,    ALG/ML

## 2020-01-17 NOTE — TELEPHONE ENCOUNTER
Type of outreach:    Phone, left message for patient to call back.     Questions for provider review:    None                                                                                                                                    GAYATRI Fountain MA       Chart routed to Care Team .

## 2020-01-28 ENCOUNTER — ANESTHESIA EVENT (OUTPATIENT)
Dept: SURGERY | Facility: CLINIC | Age: 40
End: 2020-01-28
Payer: COMMERCIAL

## 2020-01-28 DIAGNOSIS — Z01.818 PRE-OP EXAM: ICD-10-CM

## 2020-01-28 DIAGNOSIS — N92.1 MENOMETRORRHAGIA: ICD-10-CM

## 2020-01-28 LAB
ABO + RH BLD: NORMAL
ABO + RH BLD: NORMAL
BLD GP AB SCN SERPL QL: NORMAL
BLOOD BANK CMNT PATIENT-IMP: NORMAL
ERYTHROCYTE [DISTWIDTH] IN BLOOD BY AUTOMATED COUNT: 13.6 % (ref 10–15)
HCT VFR BLD AUTO: 37.7 % (ref 35–47)
HGB BLD-MCNC: 12 G/DL (ref 11.7–15.7)
MCH RBC QN AUTO: 29.2 PG (ref 26.5–33)
MCHC RBC AUTO-ENTMCNC: 31.8 G/DL (ref 31.5–36.5)
MCV RBC AUTO: 92 FL (ref 78–100)
PLATELET # BLD AUTO: 129 10E9/L (ref 150–450)
RBC # BLD AUTO: 4.11 10E12/L (ref 3.8–5.2)
SPECIMEN EXP DATE BLD: NORMAL
WBC # BLD AUTO: 5.8 10E9/L (ref 4–11)

## 2020-01-28 PROCEDURE — 86900 BLOOD TYPING SEROLOGIC ABO: CPT | Performed by: OBSTETRICS & GYNECOLOGY

## 2020-01-28 PROCEDURE — 85027 COMPLETE CBC AUTOMATED: CPT | Performed by: OBSTETRICS & GYNECOLOGY

## 2020-01-28 PROCEDURE — 36415 COLL VENOUS BLD VENIPUNCTURE: CPT | Performed by: OBSTETRICS & GYNECOLOGY

## 2020-01-28 PROCEDURE — 86901 BLOOD TYPING SEROLOGIC RH(D): CPT | Performed by: OBSTETRICS & GYNECOLOGY

## 2020-01-28 PROCEDURE — 86850 RBC ANTIBODY SCREEN: CPT | Performed by: OBSTETRICS & GYNECOLOGY

## 2020-01-29 ENCOUNTER — SURGERY (OUTPATIENT)
Age: 40
End: 2020-01-29
Payer: COMMERCIAL

## 2020-01-29 ENCOUNTER — HOSPITAL ENCOUNTER (INPATIENT)
Facility: CLINIC | Age: 40
LOS: 2 days | Discharge: HOME | End: 2020-01-31
Attending: OBSTETRICS & GYNECOLOGY | Admitting: OBSTETRICS & GYNECOLOGY
Payer: COMMERCIAL

## 2020-01-29 ENCOUNTER — ANESTHESIA (OUTPATIENT)
Dept: SURGERY | Facility: CLINIC | Age: 40
End: 2020-01-29
Payer: COMMERCIAL

## 2020-01-29 DIAGNOSIS — N94.6 DYSMENORRHEA: ICD-10-CM

## 2020-01-29 DIAGNOSIS — Z90.710 S/P ABDOMINAL HYSTERECTOMY: Primary | ICD-10-CM

## 2020-01-29 DIAGNOSIS — N92.1 MENOMETRORRHAGIA: ICD-10-CM

## 2020-01-29 LAB
B-HCG SERPL-ACNC: <1 IU/L (ref 0–5)
CREAT SERPL-MCNC: 0.53 MG/DL (ref 0.52–1.04)
ERYTHROCYTE [DISTWIDTH] IN BLOOD BY AUTOMATED COUNT: 13 % (ref 10–15)
GFR SERPL CREATININE-BSD FRML MDRD: >90 ML/MIN/{1.73_M2}
GLUCOSE SERPL-MCNC: 85 MG/DL (ref 70–99)
HCT VFR BLD AUTO: 33.5 % (ref 35–47)
HGB BLD-MCNC: 10.5 G/DL (ref 11.7–15.7)
MCH RBC QN AUTO: 29 PG (ref 26.5–33)
MCHC RBC AUTO-ENTMCNC: 31.3 G/DL (ref 31.5–36.5)
MCV RBC AUTO: 93 FL (ref 78–100)
PLATELET # BLD AUTO: 130 10E9/L (ref 150–450)
RBC # BLD AUTO: 3.62 10E12/L (ref 3.8–5.2)
WBC # BLD AUTO: 15 10E9/L (ref 4–11)

## 2020-01-29 PROCEDURE — 36415 COLL VENOUS BLD VENIPUNCTURE: CPT | Performed by: ANESTHESIOLOGY

## 2020-01-29 PROCEDURE — 71000014 ZZH RECOVERY PHASE 1 LEVEL 2 FIRST HR: Performed by: OBSTETRICS & GYNECOLOGY

## 2020-01-29 PROCEDURE — 36415 COLL VENOUS BLD VENIPUNCTURE: CPT | Performed by: OBSTETRICS & GYNECOLOGY

## 2020-01-29 PROCEDURE — 25000125 ZZHC RX 250: Performed by: NURSE ANESTHETIST, CERTIFIED REGISTERED

## 2020-01-29 PROCEDURE — 25800030 ZZH RX IP 258 OP 636: Performed by: STUDENT IN AN ORGANIZED HEALTH CARE EDUCATION/TRAINING PROGRAM

## 2020-01-29 PROCEDURE — 36000057 ZZH SURGERY LEVEL 3 1ST 30 MIN - UMMC: Performed by: OBSTETRICS & GYNECOLOGY

## 2020-01-29 PROCEDURE — 25000128 H RX IP 250 OP 636: Performed by: ANESTHESIOLOGY

## 2020-01-29 PROCEDURE — 0UT10ZZ RESECTION OF LEFT OVARY, OPEN APPROACH: ICD-10-PCS | Performed by: OBSTETRICS & GYNECOLOGY

## 2020-01-29 PROCEDURE — 25000128 H RX IP 250 OP 636: Performed by: OBSTETRICS & GYNECOLOGY

## 2020-01-29 PROCEDURE — 0UT60ZZ RESECTION OF LEFT FALLOPIAN TUBE, OPEN APPROACH: ICD-10-PCS | Performed by: OBSTETRICS & GYNECOLOGY

## 2020-01-29 PROCEDURE — 25000132 ZZH RX MED GY IP 250 OP 250 PS 637: Performed by: STUDENT IN AN ORGANIZED HEALTH CARE EDUCATION/TRAINING PROGRAM

## 2020-01-29 PROCEDURE — 25000128 H RX IP 250 OP 636: Performed by: NURSE ANESTHETIST, CERTIFIED REGISTERED

## 2020-01-29 PROCEDURE — 25000132 ZZH RX MED GY IP 250 OP 250 PS 637: Performed by: ANESTHESIOLOGY

## 2020-01-29 PROCEDURE — 85027 COMPLETE CBC AUTOMATED: CPT | Performed by: STUDENT IN AN ORGANIZED HEALTH CARE EDUCATION/TRAINING PROGRAM

## 2020-01-29 PROCEDURE — 88307 TISSUE EXAM BY PATHOLOGIST: CPT | Performed by: OBSTETRICS & GYNECOLOGY

## 2020-01-29 PROCEDURE — 25000125 ZZHC RX 250: Performed by: ANESTHESIOLOGY

## 2020-01-29 PROCEDURE — C1765 ADHESION BARRIER: HCPCS | Performed by: OBSTETRICS & GYNECOLOGY

## 2020-01-29 PROCEDURE — 40000170 ZZH STATISTIC PRE-PROCEDURE ASSESSMENT II: Performed by: OBSTETRICS & GYNECOLOGY

## 2020-01-29 PROCEDURE — 82947 ASSAY GLUCOSE BLOOD QUANT: CPT | Performed by: ANESTHESIOLOGY

## 2020-01-29 PROCEDURE — 36000059 ZZH SURGERY LEVEL 3 EA 15 ADDTL MIN UMMC: Performed by: OBSTETRICS & GYNECOLOGY

## 2020-01-29 PROCEDURE — 25000128 H RX IP 250 OP 636: Performed by: STUDENT IN AN ORGANIZED HEALTH CARE EDUCATION/TRAINING PROGRAM

## 2020-01-29 PROCEDURE — 84702 CHORIONIC GONADOTROPIN TEST: CPT | Performed by: ANESTHESIOLOGY

## 2020-01-29 PROCEDURE — C9290 INJ, BUPIVACAINE LIPOSOME: HCPCS | Performed by: ANESTHESIOLOGY

## 2020-01-29 PROCEDURE — 71000015 ZZH RECOVERY PHASE 1 LEVEL 2 EA ADDTL HR: Performed by: OBSTETRICS & GYNECOLOGY

## 2020-01-29 PROCEDURE — 12000001 ZZH R&B MED SURG/OB UMMC

## 2020-01-29 PROCEDURE — 27210794 ZZH OR GENERAL SUPPLY STERILE: Performed by: OBSTETRICS & GYNECOLOGY

## 2020-01-29 PROCEDURE — 25000566 ZZH SEVOFLURANE, EA 15 MIN: Performed by: OBSTETRICS & GYNECOLOGY

## 2020-01-29 PROCEDURE — 37000009 ZZH ANESTHESIA TECHNICAL FEE, EACH ADDTL 15 MIN: Performed by: OBSTETRICS & GYNECOLOGY

## 2020-01-29 PROCEDURE — 58150 TOTAL HYSTERECTOMY: CPT | Mod: 22 | Performed by: OBSTETRICS & GYNECOLOGY

## 2020-01-29 PROCEDURE — 82565 ASSAY OF CREATININE: CPT | Performed by: OBSTETRICS & GYNECOLOGY

## 2020-01-29 PROCEDURE — 0UT90ZZ RESECTION OF UTERUS, OPEN APPROACH: ICD-10-PCS | Performed by: OBSTETRICS & GYNECOLOGY

## 2020-01-29 PROCEDURE — 25800030 ZZH RX IP 258 OP 636: Performed by: NURSE ANESTHETIST, CERTIFIED REGISTERED

## 2020-01-29 PROCEDURE — 0UN90ZZ RELEASE UTERUS, OPEN APPROACH: ICD-10-PCS | Performed by: SURGERY

## 2020-01-29 PROCEDURE — 0KNL0ZZ RELEASE LEFT ABDOMEN MUSCLE, OPEN APPROACH: ICD-10-PCS | Performed by: OBSTETRICS & GYNECOLOGY

## 2020-01-29 PROCEDURE — 44005 FREEING OF BOWEL ADHESION: CPT | Mod: GC | Performed by: SURGERY

## 2020-01-29 PROCEDURE — 0KNK0ZZ RELEASE RIGHT ABDOMEN MUSCLE, OPEN APPROACH: ICD-10-PCS | Performed by: OBSTETRICS & GYNECOLOGY

## 2020-01-29 PROCEDURE — 36415 COLL VENOUS BLD VENIPUNCTURE: CPT | Performed by: STUDENT IN AN ORGANIZED HEALTH CARE EDUCATION/TRAINING PROGRAM

## 2020-01-29 PROCEDURE — 37000008 ZZH ANESTHESIA TECHNICAL FEE, 1ST 30 MIN: Performed by: OBSTETRICS & GYNECOLOGY

## 2020-01-29 RX ORDER — NALOXONE HYDROCHLORIDE 0.4 MG/ML
.1-.4 INJECTION, SOLUTION INTRAMUSCULAR; INTRAVENOUS; SUBCUTANEOUS
Status: DISCONTINUED | OUTPATIENT
Start: 2020-01-29 | End: 2020-01-29

## 2020-01-29 RX ORDER — OXYCODONE HYDROCHLORIDE 5 MG/1
5-10 TABLET ORAL
Status: DISCONTINUED | OUTPATIENT
Start: 2020-01-29 | End: 2020-01-31 | Stop reason: HOSPADM

## 2020-01-29 RX ORDER — ONDANSETRON 4 MG/1
4 TABLET, ORALLY DISINTEGRATING ORAL EVERY 30 MIN PRN
Status: DISCONTINUED | OUTPATIENT
Start: 2020-01-29 | End: 2020-01-29 | Stop reason: HOSPADM

## 2020-01-29 RX ORDER — FENTANYL CITRATE 50 UG/ML
25-50 INJECTION, SOLUTION INTRAMUSCULAR; INTRAVENOUS
Status: DISCONTINUED | OUTPATIENT
Start: 2020-01-29 | End: 2020-01-29 | Stop reason: HOSPADM

## 2020-01-29 RX ORDER — ONDANSETRON 2 MG/ML
4 INJECTION INTRAMUSCULAR; INTRAVENOUS EVERY 30 MIN PRN
Status: DISCONTINUED | OUTPATIENT
Start: 2020-01-29 | End: 2020-01-29 | Stop reason: HOSPADM

## 2020-01-29 RX ORDER — OXYCODONE HYDROCHLORIDE 5 MG/1
5 TABLET ORAL EVERY 4 HOURS PRN
Status: DISCONTINUED | OUTPATIENT
Start: 2020-01-29 | End: 2020-01-29

## 2020-01-29 RX ORDER — PROPOFOL 10 MG/ML
INJECTION, EMULSION INTRAVENOUS PRN
Status: DISCONTINUED | OUTPATIENT
Start: 2020-01-29 | End: 2020-01-29

## 2020-01-29 RX ORDER — ARIPIPRAZOLE 2 MG/1
2 TABLET ORAL DAILY
Status: DISCONTINUED | OUTPATIENT
Start: 2020-01-30 | End: 2020-01-31 | Stop reason: HOSPADM

## 2020-01-29 RX ORDER — HYDROMORPHONE HYDROCHLORIDE 1 MG/ML
.3-.5 INJECTION, SOLUTION INTRAMUSCULAR; INTRAVENOUS; SUBCUTANEOUS EVERY 5 MIN PRN
Status: DISCONTINUED | OUTPATIENT
Start: 2020-01-29 | End: 2020-01-29 | Stop reason: HOSPADM

## 2020-01-29 RX ORDER — CEFAZOLIN SODIUM 1 G/3ML
1 INJECTION, POWDER, FOR SOLUTION INTRAMUSCULAR; INTRAVENOUS SEE ADMIN INSTRUCTIONS
Status: DISCONTINUED | OUTPATIENT
Start: 2020-01-29 | End: 2020-01-29 | Stop reason: HOSPADM

## 2020-01-29 RX ORDER — SODIUM CHLORIDE, SODIUM LACTATE, POTASSIUM CHLORIDE, CALCIUM CHLORIDE 600; 310; 30; 20 MG/100ML; MG/100ML; MG/100ML; MG/100ML
INJECTION, SOLUTION INTRAVENOUS CONTINUOUS PRN
Status: DISCONTINUED | OUTPATIENT
Start: 2020-01-29 | End: 2020-01-29

## 2020-01-29 RX ORDER — ACETAMINOPHEN 325 MG/1
975 TABLET ORAL ONCE
Status: COMPLETED | OUTPATIENT
Start: 2020-01-29 | End: 2020-01-29

## 2020-01-29 RX ORDER — LIDOCAINE HYDROCHLORIDE 20 MG/ML
INJECTION, SOLUTION INFILTRATION; PERINEURAL PRN
Status: DISCONTINUED | OUTPATIENT
Start: 2020-01-29 | End: 2020-01-29

## 2020-01-29 RX ORDER — ACETAMINOPHEN 325 MG/1
650 TABLET ORAL EVERY 4 HOURS PRN
Status: DISCONTINUED | OUTPATIENT
Start: 2020-02-01 | End: 2020-01-31 | Stop reason: HOSPADM

## 2020-01-29 RX ORDER — ONDANSETRON 2 MG/ML
4 INJECTION INTRAMUSCULAR; INTRAVENOUS EVERY 6 HOURS PRN
Status: DISCONTINUED | OUTPATIENT
Start: 2020-01-29 | End: 2020-01-31 | Stop reason: HOSPADM

## 2020-01-29 RX ORDER — KETOROLAC TROMETHAMINE 30 MG/ML
30 INJECTION, SOLUTION INTRAMUSCULAR; INTRAVENOUS EVERY 6 HOURS
Status: COMPLETED | OUTPATIENT
Start: 2020-01-29 | End: 2020-01-30

## 2020-01-29 RX ORDER — PROPOFOL 10 MG/ML
INJECTION, EMULSION INTRAVENOUS CONTINUOUS PRN
Status: DISCONTINUED | OUTPATIENT
Start: 2020-01-29 | End: 2020-01-29

## 2020-01-29 RX ORDER — NALOXONE HYDROCHLORIDE 0.4 MG/ML
.1-.4 INJECTION, SOLUTION INTRAMUSCULAR; INTRAVENOUS; SUBCUTANEOUS
Status: DISCONTINUED | OUTPATIENT
Start: 2020-01-29 | End: 2020-01-31 | Stop reason: HOSPADM

## 2020-01-29 RX ORDER — AMOXICILLIN 250 MG
1 CAPSULE ORAL 2 TIMES DAILY
Status: DISCONTINUED | OUTPATIENT
Start: 2020-01-29 | End: 2020-01-31 | Stop reason: HOSPADM

## 2020-01-29 RX ORDER — SODIUM CHLORIDE, SODIUM LACTATE, POTASSIUM CHLORIDE, CALCIUM CHLORIDE 600; 310; 30; 20 MG/100ML; MG/100ML; MG/100ML; MG/100ML
INJECTION, SOLUTION INTRAVENOUS CONTINUOUS
Status: DISCONTINUED | OUTPATIENT
Start: 2020-01-29 | End: 2020-01-30

## 2020-01-29 RX ORDER — DIPHENHYDRAMINE HYDROCHLORIDE 50 MG/ML
25 INJECTION INTRAMUSCULAR; INTRAVENOUS EVERY 6 HOURS PRN
Status: DISCONTINUED | OUTPATIENT
Start: 2020-01-29 | End: 2020-01-31 | Stop reason: HOSPADM

## 2020-01-29 RX ORDER — ONDANSETRON 2 MG/ML
INJECTION INTRAMUSCULAR; INTRAVENOUS PRN
Status: DISCONTINUED | OUTPATIENT
Start: 2020-01-29 | End: 2020-01-29

## 2020-01-29 RX ORDER — FLUMAZENIL 0.1 MG/ML
0.2 INJECTION, SOLUTION INTRAVENOUS
Status: DISCONTINUED | OUTPATIENT
Start: 2020-01-29 | End: 2020-01-29 | Stop reason: HOSPADM

## 2020-01-29 RX ORDER — SODIUM CHLORIDE, SODIUM LACTATE, POTASSIUM CHLORIDE, CALCIUM CHLORIDE 600; 310; 30; 20 MG/100ML; MG/100ML; MG/100ML; MG/100ML
INJECTION, SOLUTION INTRAVENOUS CONTINUOUS
Status: DISCONTINUED | OUTPATIENT
Start: 2020-01-29 | End: 2020-01-29 | Stop reason: HOSPADM

## 2020-01-29 RX ORDER — NALOXONE HYDROCHLORIDE 0.4 MG/ML
.1-.4 INJECTION, SOLUTION INTRAMUSCULAR; INTRAVENOUS; SUBCUTANEOUS
Status: DISCONTINUED | OUTPATIENT
Start: 2020-01-29 | End: 2020-01-29 | Stop reason: HOSPADM

## 2020-01-29 RX ORDER — DIPHENHYDRAMINE HCL 25 MG
25 CAPSULE ORAL EVERY 6 HOURS PRN
Status: DISCONTINUED | OUTPATIENT
Start: 2020-01-29 | End: 2020-01-31 | Stop reason: HOSPADM

## 2020-01-29 RX ORDER — CEFAZOLIN SODIUM 2 G/100ML
2 INJECTION, SOLUTION INTRAVENOUS
Status: COMPLETED | OUTPATIENT
Start: 2020-01-29 | End: 2020-01-29

## 2020-01-29 RX ORDER — ACETAMINOPHEN 325 MG/1
975 TABLET ORAL EVERY 8 HOURS
Status: DISCONTINUED | OUTPATIENT
Start: 2020-01-29 | End: 2020-01-31 | Stop reason: HOSPADM

## 2020-01-29 RX ORDER — CEFAZOLIN SODIUM 2 G/100ML
2 INJECTION, SOLUTION INTRAVENOUS
Status: DISCONTINUED | OUTPATIENT
Start: 2020-01-29 | End: 2020-01-29 | Stop reason: HOSPADM

## 2020-01-29 RX ORDER — CALCIUM CARBONATE 500 MG/1
1000 TABLET, CHEWABLE ORAL 4 TIMES DAILY PRN
Status: DISCONTINUED | OUTPATIENT
Start: 2020-01-29 | End: 2020-01-31 | Stop reason: HOSPADM

## 2020-01-29 RX ORDER — BUPIVACAINE HYDROCHLORIDE AND EPINEPHRINE 2.5; 5 MG/ML; UG/ML
INJECTION, SOLUTION INFILTRATION; PERINEURAL PRN
Status: DISCONTINUED | OUTPATIENT
Start: 2020-01-29 | End: 2020-01-29

## 2020-01-29 RX ORDER — LIDOCAINE 40 MG/G
CREAM TOPICAL
Status: DISCONTINUED | OUTPATIENT
Start: 2020-01-29 | End: 2020-01-31 | Stop reason: HOSPADM

## 2020-01-29 RX ORDER — AMOXICILLIN 250 MG
2 CAPSULE ORAL 2 TIMES DAILY
Status: DISCONTINUED | OUTPATIENT
Start: 2020-01-29 | End: 2020-01-31 | Stop reason: HOSPADM

## 2020-01-29 RX ORDER — ONDANSETRON 4 MG/1
4 TABLET, ORALLY DISINTEGRATING ORAL EVERY 6 HOURS PRN
Status: DISCONTINUED | OUTPATIENT
Start: 2020-01-29 | End: 2020-01-31 | Stop reason: HOSPADM

## 2020-01-29 RX ORDER — PHENAZOPYRIDINE HYDROCHLORIDE 200 MG/1
200 TABLET, FILM COATED ORAL ONCE
Status: COMPLETED | OUTPATIENT
Start: 2020-01-29 | End: 2020-01-29

## 2020-01-29 RX ORDER — LIDOCAINE 40 MG/G
CREAM TOPICAL
Status: DISCONTINUED | OUTPATIENT
Start: 2020-01-29 | End: 2020-01-29 | Stop reason: HOSPADM

## 2020-01-29 RX ORDER — HYDROMORPHONE HYDROCHLORIDE 1 MG/ML
.3-.5 INJECTION, SOLUTION INTRAMUSCULAR; INTRAVENOUS; SUBCUTANEOUS
Status: DISCONTINUED | OUTPATIENT
Start: 2020-01-29 | End: 2020-01-31

## 2020-01-29 RX ORDER — FENTANYL CITRATE 50 UG/ML
INJECTION, SOLUTION INTRAMUSCULAR; INTRAVENOUS PRN
Status: DISCONTINUED | OUTPATIENT
Start: 2020-01-29 | End: 2020-01-29

## 2020-01-29 RX ORDER — PHENAZOPYRIDINE HYDROCHLORIDE 200 MG/1
200 TABLET, FILM COATED ORAL ONCE
Status: DISCONTINUED | OUTPATIENT
Start: 2020-01-29 | End: 2020-01-29 | Stop reason: HOSPADM

## 2020-01-29 RX ORDER — DEXAMETHASONE SODIUM PHOSPHATE 4 MG/ML
INJECTION, SOLUTION INTRA-ARTICULAR; INTRALESIONAL; INTRAMUSCULAR; INTRAVENOUS; SOFT TISSUE PRN
Status: DISCONTINUED | OUTPATIENT
Start: 2020-01-29 | End: 2020-01-29

## 2020-01-29 RX ADMIN — FENTANYL CITRATE 25 MCG: 50 INJECTION, SOLUTION INTRAMUSCULAR; INTRAVENOUS at 11:30

## 2020-01-29 RX ADMIN — BUPIVACAINE HYDROCHLORIDE AND EPINEPHRINE BITARTRATE 20 ML: 2.5; .005 INJECTION, SOLUTION INFILTRATION; PERINEURAL at 07:15

## 2020-01-29 RX ADMIN — FENTANYL CITRATE 50 MCG: 50 INJECTION, SOLUTION INTRAMUSCULAR; INTRAVENOUS at 10:00

## 2020-01-29 RX ADMIN — ROCURONIUM BROMIDE 10 MG: 10 INJECTION INTRAVENOUS at 10:56

## 2020-01-29 RX ADMIN — PROPOFOL 150 MG: 10 INJECTION, EMULSION INTRAVENOUS at 07:45

## 2020-01-29 RX ADMIN — SODIUM CHLORIDE, POTASSIUM CHLORIDE, SODIUM LACTATE AND CALCIUM CHLORIDE: 600; 310; 30; 20 INJECTION, SOLUTION INTRAVENOUS at 14:45

## 2020-01-29 RX ADMIN — BUPIVACAINE 20 ML: 13.3 INJECTION, SUSPENSION, LIPOSOMAL INFILTRATION at 07:15

## 2020-01-29 RX ADMIN — HYDROMORPHONE HYDROCHLORIDE 0.5 MG: 1 INJECTION, SOLUTION INTRAMUSCULAR; INTRAVENOUS; SUBCUTANEOUS at 08:24

## 2020-01-29 RX ADMIN — ONDANSETRON 4 MG: 2 INJECTION INTRAMUSCULAR; INTRAVENOUS at 11:21

## 2020-01-29 RX ADMIN — HYDROMORPHONE HYDROCHLORIDE 0.5 MG: 1 INJECTION, SOLUTION INTRAMUSCULAR; INTRAVENOUS; SUBCUTANEOUS at 13:15

## 2020-01-29 RX ADMIN — ROCURONIUM BROMIDE 20 MG: 10 INJECTION INTRAVENOUS at 08:41

## 2020-01-29 RX ADMIN — SODIUM CHLORIDE, POTASSIUM CHLORIDE, SODIUM LACTATE AND CALCIUM CHLORIDE: 600; 310; 30; 20 INJECTION, SOLUTION INTRAVENOUS at 16:31

## 2020-01-29 RX ADMIN — FENTANYL CITRATE 50 MCG: 50 INJECTION, SOLUTION INTRAMUSCULAR; INTRAVENOUS at 07:06

## 2020-01-29 RX ADMIN — PHENAZOPYRIDINE HYDROCHLORIDE 200 MG: 200 TABLET, FILM COATED ORAL at 06:23

## 2020-01-29 RX ADMIN — CEFAZOLIN SODIUM 2 G: 2 INJECTION, SOLUTION INTRAVENOUS at 07:50

## 2020-01-29 RX ADMIN — FENTANYL CITRATE 100 MCG: 50 INJECTION, SOLUTION INTRAMUSCULAR; INTRAVENOUS at 07:44

## 2020-01-29 RX ADMIN — MIDAZOLAM HYDROCHLORIDE 2 MG: 1 INJECTION, SOLUTION INTRAMUSCULAR; INTRAVENOUS at 07:09

## 2020-01-29 RX ADMIN — KETOROLAC TROMETHAMINE 30 MG: 30 INJECTION, SOLUTION INTRAMUSCULAR at 18:54

## 2020-01-29 RX ADMIN — HYDROMORPHONE HYDROCHLORIDE 0.5 MG: 1 INJECTION, SOLUTION INTRAMUSCULAR; INTRAVENOUS; SUBCUTANEOUS at 08:00

## 2020-01-29 RX ADMIN — FENTANYL CITRATE 25 MCG: 50 INJECTION, SOLUTION INTRAMUSCULAR; INTRAVENOUS at 12:39

## 2020-01-29 RX ADMIN — ONDANSETRON 4 MG: 4 TABLET, ORALLY DISINTEGRATING ORAL at 16:31

## 2020-01-29 RX ADMIN — FENTANYL CITRATE 25 MCG: 50 INJECTION, SOLUTION INTRAMUSCULAR; INTRAVENOUS at 12:46

## 2020-01-29 RX ADMIN — ROCURONIUM BROMIDE 10 MG: 10 INJECTION INTRAVENOUS at 09:37

## 2020-01-29 RX ADMIN — PROPOFOL 30 MCG/KG/MIN: 10 INJECTION, EMULSION INTRAVENOUS at 08:00

## 2020-01-29 RX ADMIN — ROCURONIUM BROMIDE 50 MG: 10 INJECTION INTRAVENOUS at 07:45

## 2020-01-29 RX ADMIN — ROCURONIUM BROMIDE 20 MG: 10 INJECTION INTRAVENOUS at 10:04

## 2020-01-29 RX ADMIN — SODIUM CHLORIDE, POTASSIUM CHLORIDE, SODIUM LACTATE AND CALCIUM CHLORIDE: 600; 310; 30; 20 INJECTION, SOLUTION INTRAVENOUS at 07:36

## 2020-01-29 RX ADMIN — LIDOCAINE HYDROCHLORIDE 80 MG: 20 INJECTION, SOLUTION INFILTRATION; PERINEURAL at 07:43

## 2020-01-29 RX ADMIN — SODIUM CHLORIDE, POTASSIUM CHLORIDE, SODIUM LACTATE AND CALCIUM CHLORIDE: 600; 310; 30; 20 INJECTION, SOLUTION INTRAVENOUS at 11:12

## 2020-01-29 RX ADMIN — CEFAZOLIN SODIUM 1 G: 2 INJECTION, SOLUTION INTRAVENOUS at 09:50

## 2020-01-29 RX ADMIN — ONDANSETRON 4 MG: 2 INJECTION INTRAMUSCULAR; INTRAVENOUS at 12:41

## 2020-01-29 RX ADMIN — DEXAMETHASONE SODIUM PHOSPHATE 6 MG: 4 INJECTION, SOLUTION INTRAMUSCULAR; INTRAVENOUS at 07:50

## 2020-01-29 RX ADMIN — ACETAMINOPHEN 975 MG: 325 TABLET, FILM COATED ORAL at 06:23

## 2020-01-29 RX ADMIN — SUGAMMADEX 200 MG: 100 INJECTION, SOLUTION INTRAVENOUS at 11:55

## 2020-01-29 RX ADMIN — SODIUM CHLORIDE, POTASSIUM CHLORIDE, SODIUM LACTATE AND CALCIUM CHLORIDE: 600; 310; 30; 20 INJECTION, SOLUTION INTRAVENOUS at 09:55

## 2020-01-29 ASSESSMENT — ACTIVITIES OF DAILY LIVING (ADL)
COGNITION: 0 - NO COGNITION ISSUES REPORTED
TOILETING: 0-->INDEPENDENT
FALL_HISTORY_WITHIN_LAST_SIX_MONTHS: NO
AMBULATION: 0-->INDEPENDENT
RETIRED_COMMUNICATION: 0-->UNDERSTANDS/COMMUNICATES WITHOUT DIFFICULTY
SWALLOWING: 0-->SWALLOWS FOODS/LIQUIDS WITHOUT DIFFICULTY
DRESS: 0-->INDEPENDENT
ADLS_ACUITY_SCORE: 11
ADLS_ACUITY_SCORE: 13
BATHING: 0-->INDEPENDENT
RETIRED_EATING: 0-->INDEPENDENT
TRANSFERRING: 0-->INDEPENDENT

## 2020-01-29 ASSESSMENT — MIFFLIN-ST. JEOR: SCORE: 1524.24

## 2020-01-29 NOTE — OP NOTE
Procedure Date: 2020      PREOPERATIVE DIAGNOSES:   1.  Pelvic pain, here for open hysterectomy.   2.  Dense pelvic adhesions.      POSTOPERATIVE DIAGNOSES:   1.  Pelvic pain, here for open hysterectomy.   2.  Dense pelvic adhesions.      PROCEDURE PERFORMED:  Adhesiolysis and exposure of the uterus and pelvis.      SURGEON:  Anselmo Newsome MD      GYNECOLOGIST:  Dr. Natalie Armstrong      ANESTHESIA:  General endotracheal.      ESTIMATED BLOOD LOSS:  Minimal.      DRAINS:  None.      COMPLICATIONS:  None.      OPERATIVE FINDINGS:  The patient had dense adhesions from her anterior abdominal wall, previous  section wound up to the rectus sheath on the anterior aspect of the uterus.  The window between the uterus and the bladder appeared to be relatively clear.  She had also several dense bands of adhesions from her viscera, sigmoid colon and small bowel loops to the dome of her uterus and the posterior side, but the posterior cul-de-sac was again relatively clear once we got through those first bands.      OPERATIVE PROCEDURE:  This is 39-year-old female with a history of previous  sections, I believe, x 4.  The patient is having severe pain and discomfort in her pelvis, presenting for a hysterectomy by Dr. Armstrong and her team.  I was asked for an urgent intraoperative consult and assistance in taking down the adhesions exposing the uterus, the lateral bar ligaments and ovaries for this patient's hysterectomy.      Upon my arrival, the patient was already under general anesthetic and was positioned per their choice.  The abdomen was already opened.  They had nicely entered the general abdominal cavity in the pelvis, but there were several dense adhesions from what is described above from several loops of bowel, pelvic sidewall to the uterus, and the worse was anterior.      The patient already had granted consent and we were unable to gain additional consent issues, as   she was already  under general anesthesia.      With the operating physicians permission, I scrubbed and jointed the care.  With their exposure with retractors, primarily handheld Denise and Deavers, we were nicely able to expose the adhesive bands and going through them one at a time, thinned them out, and they were all taken down with the cautery.  Initially began posteriorly, freed up the posterior pelvic rim and then took the bowel adhesions off without incident.  There is no evidence of any injury to the intestine or any other named structures.  Then came around anteriorly and then with Allis clamps on the anterior abdominal wall, gripping the uterus, bringing it up, placing them under tension, we were able to come through these very dense fibrotic band in methodical fashion until we freed up the uterus from its scar to the anterior rectus.  It came down nicely off of the bladder and the space in between the bladder.  There did not appear to be any fistulous connections or dense scarring to the bladder directly.  They appeared to all be to the rectus sheath.  At this point, they felt very comfortable proceeding with the operation.  I concluded my participation.  Confirmed hemostasis in the field and left them to finish that procedure.  They will contact me if they have any further need for assistance.         RUSTAM HOLLIDAY MD             D: 2020   T: 2020   MT: SHAYY      Name:     AILYN ROMO   MRN:      [id number]        Account:        [id number]   :      1980           Procedure Date: 2020      Document: [id number]       cc: [facility] Surgery Billing

## 2020-01-29 NOTE — PROGRESS NOTES
"[facility]  Gynecology Progress Note      S:  Patient feels very sleep from surgery. Reports abdominal pain. Thinks abdominal binder is making the pain worse. Has not passed flatus. Tolerating ice chips. No nausea or vomiting. Has not been out of bed as she feels non quite like herself from the anesthesia.  Pena in place.    O:  Patient Vitals for the past 24 hrs:   BP Temp Temp src Pulse Heart Rate Resp SpO2 Height Weight   01/29/20 1455 136/76 -- -- 64 -- 16 99 % -- --   01/29/20 1440 125/76 -- -- 63 -- 14 99 % -- --   01/29/20 1425 130/76 -- -- 62 -- 15 98 % -- --   01/29/20 1410 (!) 149/86 -- -- -- -- -- 98 % -- --   01/29/20 1355 (!) 152/72 -- -- 66 -- 14 100 % -- --   01/29/20 1353 (!) 147/85 97.4  F (36.3  C) Oral 66 -- 14 99 % -- --   01/29/20 1329 -- -- -- -- -- -- 99 % -- --   01/29/20 1315 132/88 -- -- 70 67 12 98 % -- --   01/29/20 1308 -- 98.8  F (37.1  C) Axillary -- 67 15 98 % -- --   01/29/20 1300 -- 98.8  F (37.1  C) Axillary -- 69 14 98 % -- --   01/29/20 1245 131/83 -- -- 69 72 11 99 % -- --   01/29/20 1230 (!) 145/82 99.3  F (37.4  C) Axillary 75 76 18 99 % -- --   01/29/20 1215 127/83 -- -- 75 76 18 100 % -- --   01/29/20 1204 133/83 99.4  F (37.4  C) Axillary 78 -- 16 100 % -- --   01/29/20 0715 115/68 -- -- 82 74 17 100 % -- --   01/29/20 0710 114/68 -- -- -- 74 17 100 % -- --   01/29/20 0706 121/83 -- -- 72 74 12 98 % -- --   01/29/20 0538 129/87 98.6  F (37  C) Oral 74 -- 18 98 % 1.698 m (5' 6.85\") 82.9 kg (182 lb 12.2 oz)     Gen: Resting comfortably, NAD  CV: RRR, no murmurs  Pulm: CTAB, no wheezes  Abd: Soft, appropriately ttp, non-distended, +BS  Inc: Dressing in place with light shadowing on right hand aspect. No erythema or induration  Ext: SCDs in place, scant LE edema b/l    Urine output:  In OR: 290  Postop: 160/3.5 hours    A/P:  Ms. Traci Weiner is a 39 year old on POD #0 s/p TERRENCE-BS. Early postop period, not yet meeting goals. Encouraged sips, " advancing diet as tolerating. Will ambulate with assistance when feeling less fatigued. Pena out when able to ambulate to bathroom.    Depression  - Abilify 2 mg daily  - Zoloft 50 mg daily    Postoperative care  Pain: - Continue Ibuprofen and Tylenol, oxycodone prn  FEN/GI:- Advance diet as tolerated   - Wean IVF when tolerating adequate PO  CV: - mildly hypertensive to normotensive. Pulse within normal limits  Pulm: - No active issues  : - monitor UOP. Pena out when ambulating  Heme: - Hgb 12.0 > EBL 1020 mL> follow up 1700 hgb  PPX: - SCDs in place, encourage IS, PPI, bowel regimen in place  Dispo: - In house for routine postop care.  Likely d/c POD#1-2    Francesca Golden MD  OB/GYN, PGY-1  1/29/2020, 3:08 PM

## 2020-01-29 NOTE — OP NOTE
Operative Note    Patient: Traci Weiner   : 1980  MRN: [id number]    Date of Service: 2020    Pre-operative diagnosis:  1. Abnormal uterine bleeding  2. Dysmenorrhea  3. Pelvic pain  4. four prior  sections    Post-operative diagnosis:  1. Same as above  2. Extensive pelvic adhesions  3. Abnormal appearing left ovary  4. Right fallopian tube scarred to the right ovary    Procedure:   1. Total abdominal hysterectomy, left salpingo-oophorectomy  2. Extensive Lysis of pelvic adhesions    Surgeons:   Natalie Armstrong MD - Primary   MD Anselmo Arzola MD, Pediatric Surgery - intraoperatively consulted to assist with lysis of bowel and bladder adhesions  Chandler Cleary MD, PGY-4 - Assistant    Anesthesia: General with TAP block    EBL: 1020 mL  Urine: 350 mL  Fluids: 2300 mL    Specimens:    Uterus, cervix, left fallopian tube and left ovary    Complications: None apparent    Findings:   1. EUA: multiparous, mobile cervix without any lesions or masses. Enlarged uterus. No adnexal masses palpated.  2. Laparotomy: Thickened fascia. Very dense rectofascial adhesions. Anterior wall of uterus densely adherent to anterior abdominal wall and bladder. Large bowel adhesions to posterior uterine wall. Dense band of adhesion from posterior uterine wall to sigmoid colon. General surgery consulted intraoperatively to assist with lysis of adhesions of dense bowel and bladder adhesions to uterus. Adhesion of right adnexa to bowel and right pelvic side wall. Approximately 1 cm hard, nodular mass within stroma of both ovaries though mass on left ovary felt to be more firm. Mass on left ovary had a consistency that was more firm than a typical fibroma, thus decision made to remove left ovary (in addition to the fallopian tube) and send to pathology. The right fallopian tube was edematous and quite adherent to the right ovary, thus we decided to leave it in situ as risk of bleeding and injury with  removal outweighed the benefits.  3. Vaginal cuff, surgical pedicles, rectus muscles and bladder peritoneum hemostatic at end of procedure. Seprafilm placed along sigmoid colon where area of dissection from uterus occurred.    Indications: Traci Weiner is a 39 year old female with history of four prior  sections who desired definitive surgical management with abdominal hysterectomy secondary to abnormal uterine bleeding and dysmenorrhea. Discussed risks, benefits, and alternatives to the procedure including risk of infection, bleeding, and damage to local organs. The patient's questions were answered, understanding confirmed, and the patient signed written informed consent.    Procedure:  The patient was taken to the operating room where she was positioned, prepped and draped in the usual sterile fashion. GETA was obtained and found to be adequate. A safety time out was performed.    A transverse abdominal incision was made approximately 10 mm above her prior Pfannenstiel incision. The incision was carried through the subcutaneous tissue to the fascia which was incised and extended with a scalpel and Vo scissors. The rectus muscle was densely adherent to fascia and carefully dissected off the fascia with scalpel and electrocautery. During this process, peritoneum was entered. There was no injury to underlying organs. The opening was extended with electrocautery and Metzenbaum scissors. Survey of abdomen demonstrated uterus to be completely wrapped in dense adhesions.  Fundus could not be seen until adhesions were taken down.  Uterus was densely adherent to bladder, rectus sheath and anterior abdominal wall. There was also dense bands of adhesions of sigmoid colon and bowel to posterior wall of the uterus. We were able to thin out some of these adhesions and take them down with Metzenbaum scissors and cautery. We then directed out attention to the right adnexa. The right ureter was identified and seen to  phillipate. A window was created in the broad ligament and the right uteroovarian ligament isolated. With the right ureter seen to be well away from the surgical field, the right uteroovarian ligament was clamped, cut and ligated with a free tie and suture. Next the round ligament was transected and the broad ligament further dissected. Anterior leaf of the broad ligament was dissected down to the midline of the bladder reflection. At this time, we again turned our attention to the bowel adhesions to the posterior uterus. General surgery was consulted intraoperatively to assist with lysis of adhesions of bowel and bladder from the uterus. Please see Dr. Newsome's operative note for details.     Attention was then turned to the left adnexa. In similar fashion the left round ligament was transected and the anterior and posterior leaves of the broad ligament dissected down toward the cervix. The left ureter was identified to be away from the surgical field. The left uteroovarian ligament was isolated, clamped, cut and ligated with a free tie and suture. Bilateral uterine arteries were skeletonized and clamped, transected and suture ligated. The vesicouterine peritoneum was further dissected off from the lower uterine segment and cervix. The cardinal ligaments on both sides were then serially clamped using straight Juliane clamp, transected and suture ligated. The dissection was then carried down on cardinal ligaments and uterosacral ligaments using straight Juliane clamp, transected and suture ligated using 0 Vicryl. At this time, the vagina and the cervix was then grasped from both sides using a Juliane clamp and the cervix was then amputated from the upper vagina using Latonia scissors. The uterus and cervix were handed off to be sent to pathology. The anterior surface of the uterus was marked with ink.   Vaginal cuff angles were closed using 0-Vicryl with Juliane stitches. The vaginal cuff was closed using figure of X  sutures of 0-Vicryl. Bleeding near the right apex of the vaginal cuff was controlled with a running locked suture of 0-Vicryl. The pelvis was copiously irrigated.     Within the stroma of both ovaries, an approximately 1 cm hard, nodular mass was palpated, though mass on left ovary was felt to be more firm. Mass on left ovary had a consistency not typical of fibroma, thus we decided to remove the left ovary (in addition to the fallopian tube) and send it to pathology. After identifying the left ureter and ensuring it was safely out of the way, the left IP ligament was isolated, clamped cut and ligated with a free tie and suture. The right fallopian tube was edematous and quite adherent to the right ovary, thus we decided to leave it in situ as risk of bleeding and injury with removal outweighed the benefits.    Seprafilm was placed within the posterior cul de sac where the sigmoid colon was dissected off the posterior wall of the uterus. Surgical pedicles were noted to be hemostatic. Fascia and muscles were inspected and was made hemostatic with cautery. <1 cm defects of the fascia superiorly and inferiorly was re-approximated with a figure of eight stitch of 0-Vicryl. The fascia was closed with two looped 0 PDS sutures starting at the apices and tied in the middle of the incision. The subcutaneous tissue was re-approximated with a running suture of 3-0 Plain. The skin was closed with 4-0 Vicryl.  Instrument, needle, and sponge counts were correct times 2. Dr. Garcia was present and scrubbed for the entire procedure. Dr. Kothari was present and scrubbed until fascial closure. The patient was transferred to the PACU in stable condition.  The entire case took about 4 hours with 90 min or more spent in adhesiolysis.     Chandler Cleary MD  OB/GYN Resident, PGY-4  1/29/2020    Physician Attestation   I was present for the entire procedure between opening and closing. I have reviewed and edited the above operative report to  reflect the exact findings and details of the surgical procedure.  Due to the dense adhesions over the uterus both from bowel and bladder, it was necessary for Dr Kothari to assist on this case as a skilled surgeon for assistance with retraction and dissection.     Natalie Armstrong MD

## 2020-01-29 NOTE — H&P
[facility] History and Physical    Traci Weiner MRN# [id number]   Age: 39 year old YOB: 1980     Date of Admission:  2020    Primary care provider: Eli Alvarado    HPI: Traci Weiner is a 39 year old  with abnormal uterine bleeding and dysmenorrhea who desires definitive surgical management. She is nervous but otherwise is doing well this morning. No questions or concerns. She denies any changes to her health over the last few months. She denies any bleeding or clotting issues with her prior surgeries.    ROS: 10 point review of systems negative other than noted above    PMH:   Past Medical History:   Diagnosis Date     Depressive disorder      PSHx:   Past Surgical History:   Procedure Laterality Date     APPENDECTOMY  2016     CHOLECYSTECTOMY  2004    gall bladder removed     GYN SURGERY       X 4     Medications:   Current Facility-Administered Medications   Medication     ceFAZolin (ANCEF) 1 g vial to attach to  ml bag for ADULT or 50 ml bag for PEDS     ceFAZolin (ANCEF) 1 g vial to attach to  ml bag for ADULT or 50 ml bag for PEDS     ceFAZolin (ANCEF) intermittent infusion 2 g in 100 mL dextrose PRE-MIX     ceFAZolin (ANCEF) intermittent infusion 2 g in 100 mL dextrose PRE-MIX     lactated ringers infusion     lactated ringers infusion     lidocaine (LMX4) cream     lidocaine 1 % 0.1-1 mL     phenazopyridine (PYRIDIUM) tablet 200 mg     sodium chloride (PF) 0.9% PF flush 3 mL     sodium chloride (PF) 0.9% PF flush 3 mL     No current facility-administered medications on file prior to encounter.   ARIPiprazole (ABILIFY) 2 MG tablet, Take 1 tablet (2 mg) by mouth daily  naproxen (NAPROSYN) 500 MG tablet, Take 1 tablet (500 mg) by mouth 2 times daily (with meals)  sertraline (ZOLOFT) 50 MG tablet, Take 1 tablet (50 mg) by mouth daily  ferrous gluconate (FERGON) 324 (38 Fe) MG tablet, Take 1 tablet (324 mg) by mouth daily (with  breakfast)      Allergies: morphine - rash     Allergies   Allergen Reactions     Morphine Rash     Social History:   Social History     Socioeconomic History     Marital status: Single     Spouse name: Not on file     Number of children: Not on file     Years of education: Not on file     Highest education level: Not on file   Occupational History     Not on file   Social Needs     Financial resource strain: Not on file     Food insecurity:     Worry: Not on file     Inability: Not on file     Transportation needs:     Medical: Not on file     Non-medical: Not on file   Tobacco Use     Smoking status: Current Some Day Smoker     Packs/day: 0.30     Years: 20.00     Pack years: 6.00     Types: Cigarettes     Start date: 3/13/1997     Smokeless tobacco: Never Used   Substance and Sexual Activity     Alcohol use: No     Drug use: Yes     Types: Marijuana     Sexual activity: Not Currently     Partners: Male     Birth control/protection: Female Surgical     Comment: tubal   Lifestyle     Physical activity:     Days per week: Not on file     Minutes per session: Not on file     Stress: Not on file   Relationships     Social connections:     Talks on phone: Not on file     Gets together: Not on file     Attends Christian service: Not on file     Active member of club or organization: Not on file     Attends meetings of clubs or organizations: Not on file     Relationship status: Not on file     Intimate partner violence:     Fear of current or ex partner: Not on file     Emotionally abused: Not on file     Physically abused: Not on file     Forced sexual activity: Not on file   Other Topics Concern     Parent/sibling w/ CABG, MI or angioplasty before 65F 55M? Not Asked   Social History Narrative     Not on file     Social History   Denies any family history of bleeding or clotting disorders.    Socioeconomic History     Marital status: Single     Spouse name: None     Number of children: None     Years of education: None  "    Highest education level: None   Occupational History     None   Social Needs     Financial resource strain: None     Food insecurity:     Worry: None     Inability: None     Transportation needs:     Medical: None     Non-medical: None   Tobacco Use     Smoking status: Current Some Day Smoker     Packs/day: 0.30     Years: 20.00     Pack years: 6.00     Types: Cigarettes     Start date: 3/13/1997     Smokeless tobacco: Never Used   Substance and Sexual Activity     Alcohol use: No     Drug use: Yes     Types: Marijuana     Sexual activity: Not Currently     Partners: Male     Birth control/protection: Female Surgical     Comment: tubal   Lifestyle     Physical activity:     Days per week: None     Minutes per session: None     Stress: None   Relationships     Social connections:     Talks on phone: None     Gets together: None     Attends Restorationism service: None     Active member of club or organization: None     Attends meetings of clubs or organizations: None     Relationship status: None     Intimate partner violence:     Fear of current or ex partner: None     Emotionally abused: None     Physically abused: None     Forced sexual activity: None   Other Topics Concern     Parent/sibling w/ CABG, MI or angioplasty before 65F 55M? Not Asked   Social History Narrative     None     Physical Exam:   Vitals:    20 0538   BP: 129/87   BP Location: Right arm   Pulse: 74   Resp: 18   Temp: 98.6  F (37  C)   TempSrc: Oral   SpO2: 98%   Weight: 82.9 kg (182 lb 12.2 oz)   Height: 1.698 m (5' 6.85\")      Gen: sitting up, nervous, no acute distress  CV: RRR, no murmurs  Pulm: CTAB, no increased work of breathing, no wheezing/rhonchi/crackles  Abd: non-tender, non-distended  Extremities: non-tender, no erythema; no edema    Labs:  HCG neg  Hgb 12 ()   ()  Rh pos    A&P: Traci Weiner  presents for scheduled total abdominal hysterectomy and bilateral salpingectomy. Reviewed medical history and " there are no changes. Labs notable for mild thrombocytopenia, which it does not appear she has been worked up for. Will plan for repeat CBC in the AM and will discuss outpatient follow-up for this. She is medically cleared to surgery today.    Chandler Cleary, PGY-4  OBGYN Resident  1/29/2020 6:44 AM     I have assessed patient and agree with the above note.    Reviewed surgery and informed consent has been obtained   Natalie Armstrong MD

## 2020-01-29 NOTE — BRIEF OP NOTE
[facility], [place]    Brief Operative Note    Pre-operative diagnosis:   - Abnormal uterine bleeding  - Dysmenorrhea    Post-operative diagnosis  - Abnormal uterine bleeding  -Dysmenorrhea    Procedure:  1. Total abdominal hysterectomy, left salpingo-oophorectomy  2. Lysis of adhesions    Surgeons:      * Natalie Armstrong MD - Primary     * Velia Kothari MD - Assisting     * Anselmo Newsome MD - Assisting     * Chandler Cleary MD - Resident - Assisting    Anesthesia: Combined General with Block   Estimated blood loss: 1020 mL  UOP: 350 mL pyridium-stained urine  IVF: 2300 mL LR    Drains:  Pena    Specimens:   ID Type Source Tests Collected by Time Destination   A : Error Tissue Uterus, Cervix SURGICAL PATHOLOGY EXAM (Canceled) Natalie Armstrong MD 1/29/2020 10:38 AM    B : Uterus, cervix, left fallopian tube and ovary Organ Uterus, Cervix, Left Fallopian Tube SURGICAL PATHOLOGY EXAM (Canceled) Natalie Armstrong MD 1/29/2020 10:41 AM      Findings:     1. EUA: multiparous, mobile cervix without any lesions or masses. Small uterus. No adnexal masses palpated.  2. Laparotomy: anterior wall of uterus densely adherent to anterior abdominal wall and bladder. Large bowel adhesions to posterior uterine wall. Dense band of adhesion from posterior uterine wall to sigmoid colon. General surgery consulted intraoperatively who assisted with lysis of adhesions of dense bowel and bladder adhesions to uterus. Adhesion of right adnexa to bowel and right pelvic side wall.  3. Vaginal cuff, surgical pedicles, rectus muscles and bladder peritoneum hemostatic at end of procedure. Seprafilm placed along sigmoid colon where area of dissection from uterus occurred.  Complications: None apparent    Chandler Cleary MD  OB/GYN Resident, PGY-4  1/29/2020

## 2020-01-29 NOTE — DISCHARGE SUMMARY
[facility]  Gynecology Discharge Summary    Admission Date:  2020  Discharge Date:  2020    Admission Attending: Natalie Armstrong MD  Discharge Attending: Velia Kothari MD    Admission Diagnosis:  - Abnormal uterine bleeding  - Dysmenorrhea    Discharge Diagnosis:  - Same as above  -asymptomatic anemia of surgical blood loss    Procedures Performed:  - Total abdominal hysterectomy, left salpingo-oophorectomy  - Extensive lysis of adhesions    Admission History:  Ms. Traci Weiner is a 39 year old  with history of abnormal uterine bleeding and dysmenorrhea who desired definitive surgical management with hysterectomy. The risks, benefits, and alternatives of surgery were discussed, and she agreed to proceed.    Operative Course:  She underwent a total abdominal hysterectomy, left salpingo-oophorectomy, extensive lysis of adhesions, which was uncomplicated. EBL was 1020 mL. See operative report for further details.    Operative Findings:   1. EUA: multiparous, mobile cervix without any lesions or masses. Small uterus. No adnexal masses palpated.  2. Laparotomy: Thickened fascia. Very dense rectofascial adhesions. Anterior wall of uterus densely adherent to anterior abdominal wall and bladder. Large bowel adhesions to posterior uterine wall. Dense band of adhesion from posterior uterine wall to sigmoid colon. General surgery consulted intraoperatively to assist with lysis of adhesions of dense bowel and bladder adhesions to uterus. Adhesion of right adnexa to bowel and right pelvic side wall. Approximately 1 cm hard, nodular mass within stroma of both ovaries though mass on left ovary felt to be more firm. Mass on left ovary had a consistency not typical of fibroma, thus decision made to remove left ovary (in addition to the fallopian tube) and send to pathology. The right fallopian tube was edematous and quite adherent to the right ovary, thus we decided to leave it in  situ as risk of bleeding and injury with removal outweighed the benefits.  3. Vaginal cuff, surgical pedicles, rectus muscles and bladder peritoneum hemostatic at end of procedure. Seprafilm placed along sigmoid colon where area of dissection from uterus occurred.    Hospital Course:  Her postoperative course was uncomplicated. She was initially maintained on IV fluids with IV pain medications and vieira catheter in place. On POD#1, diet was advanced, vieira catheter was removed, and she was transitioned to PO pain medications. On POD#2 she was tolerating regular diet, ambulating without difficulty, voiding spontaneously, and controlling pain with PO medications, and she was deemed stable for discharge. Hemoglobin at the time of discharge was 9.2.    Discharge Plans:  - The patient was discharged to home  - PO Activity:   - No lifting >15 lbs or strenuous exercise for 6 weeks   - No driving while taking narcotics or until you can slam on the breaks without pain  - She was instructed to call [facility] or return to ED if she has any of the following:    - Temperature greater than 100.4F   - Pain not controlled by pain medications   - Uncontrolled nausea/vomiting   - Foul-smelling vaginal discharge   - Vaginal bleeding soaking 1 pad per hour for 2 hours in a row  - She will follow up with Dr. Armstrong in 4-6 weeks for postoperative visit    - Discharge medications include:     Review of your medicines      START taking      Dose / Directions   acetaminophen 325 MG tablet  Commonly known as:  TYLENOL      Dose:  325-650 mg  Take 1-2 tablets (325-650 mg) by mouth every 6 hours as needed for mild pain  Quantity:  40 tablet  Refills:  0     SENNA-docusate sodium 8.6-50 MG tablet  Commonly known as:  SENNA S      Dose:  1 tablet  Take 1 tablet by mouth At Bedtime  Quantity:  40 tablet  Refills:  0        CONTINUE these medicines which have NOT CHANGED      Dose / Directions   ARIPiprazole 2 MG tablet  Commonly  known as:  ABILIFY  Used for:  Mild major depression (H)      Dose:  2 mg  Take 1 tablet (2 mg) by mouth daily  Quantity:  90 tablet  Refills:  0     naproxen 500 MG tablet  Commonly known as:  NAPROSYN  Used for:  Dysmenorrhea      Dose:  500 mg  Take 1 tablet (500 mg) by mouth 2 times daily (with meals)  Quantity:  30 tablet  Refills:  0     sertraline 50 MG tablet  Commonly known as:  ZOLOFT  Used for:  Mild major depression (H)      Dose:  50 mg  Take 1 tablet (50 mg) by mouth daily  Quantity:  90 tablet  Refills:  0           Where to get your medicines      These medications were sent to [facility] [place], [place] - [address]  [address] [address]    Phone:  [phone]     acetaminophen 325 MG tablet    SENNA-docusate sodium 8.6-50 MG tablet       Francesca Golden MD  Obstetrics and Gynecology, PGY-1  01/31/20. 7:00 AM

## 2020-01-29 NOTE — OR NURSING
PACU to Inpatient Nursing Handoff    Patient Traci Weiner is a 39 year old female who speaks English.   Procedure Procedure(s):  HYSTERECTOMY, TOTAL, ABDOMINAL, WITH Left SALPINGECTOMY-oophorectomy  Laparotomy, lysis adhesions, combined   Surgeon(s) Primary: Natalie Armstrong MD  Assisting: Anselmo Newsome MD  Resident - Assisting: Chandler Cleary MD     Allergies   Allergen Reactions     Morphine Rash       Isolation  [unfilled]     Past Medical History   has a past medical history of Depressive disorder.    Anesthesia Combined General with Block   Dermatome Level     Preop Meds acetaminophen (Tylenol) - time given: 0623  phenazopyridine (Pyridium) - time given: 0623   Nerve block Transversus abdominus plane (TAP).  Location:bilateral. Med:bupivacaine and Exparel (liposomal bupivacaine). Time given: 0715   Intraop Meds dexamethasone (Decadron)  fentanyl (Sublimaze): 175 mcg total  hydromorphone (Dilaudid): 1.0 mg total  ondansetron (Zofran): last given at 1121   Local Meds No   Antibiotics cefazolin (Ancef) - last given at 0950     Pain Patient Currently in Pain: denies  Comfort: negligible pain   PACU meds  fentanyl (Sublimaze): 50 mcg (total dose) last given at 1246   ondansetron (Zofran): 4 mg (total dose) last given at 1239    PCA / epidural No   Capnography     Telemetry ECG Rhythm: Normal sinus rhythm   Inpatient Telemetry Monitor Ordered? No        Labs Glucose Lab Results   Component Value Date    GLC 85 01/29/2020       Hgb Lab Results   Component Value Date    HGB 12.0 01/28/2020       INR No results found for: INR   PACU Imaging Not applicable     Wound/Incision Incision/Surgical Site 01/29/20 Lower Abdomen (Active)   Incision Assessment UTV 1/29/2020 12:04 PM   Closure Other (Comment) 1/29/2020 12:04 PM   Incision Care Normal saline 1/29/2020 11:23 AM   Dressing Intervention Clean, dry, intact 1/29/2020 12:04 PM   Number of days: 0      CMS        Equipment abdominal binder   Other LDA       IV  Access Peripheral IV 01/29/20 Right Wrist (Active)   Site Assessment WDL 1/29/2020 12:04 PM   Line Status Infusing 1/29/2020 12:04 PM   Phlebitis Scale 0-->no symptoms 1/29/2020 12:04 PM   Infiltration Scale 0 1/29/2020 12:04 PM   Extravasation? No 1/29/2020 12:04 PM   Dressing Intervention New dressing  1/29/2020  6:02 AM   Number of days: 0      Blood Products Not applicable EBL 1100 mL   Intake/Output Date 01/29/20 0700 - 01/30/20 0659   Shift 0906-2622 1683-1136 5995-0669 24 Hour Total   INTAKE   I.V. 2300   2300   Shift Total(mL/kg) 2300(27.74)   2300(27.74)   OUTPUT   Urine 350   350   Blood 1020   1020   Shift Total(mL/kg) 1370(16.53)   1370(16.53)   Weight (kg) 82.9 82.9 82.9 82.9      Drains / Pena Urethral Catheter Straight-tip;Double-lumen 16 fr (Active)   Tube Description UTV 1/29/2020 12:04 PM   Collection Container Standard 1/29/2020 12:04 PM   Securement Method Securing device (Describe) 1/29/2020 12:04 PM   Number of days: 0      Time of void PreOp Void Prior to Procedure: 0605 (01/29/20 0629)    PostOp Urine Occurrence: 1(pre-op void) (01/29/20 0605)    Diapered? No   Bladder Scan     PO    tolerating sips     Vitals    B/P: 133/83  T: 99.4  F (37.4  C)    Temp src: Axillary  P:  Pulse: 78 (01/29/20 1204)    Heart Rate: 74 (01/29/20 0715)     R: 16  O2:  SpO2: 100 %    O2 Device: Simple face mask (01/29/20 1204)    Oxygen Delivery: 6 LPM (01/29/20 1204)         Family/support present significant other   Patient belongings     Patient transported on cart   DC meds/scripts (obs/outpt) Not applicable   Inpatient Pain Meds Released? Yes       Special needs/considerations None   Tasks needing completion None       William Ford RN  [facility] [id number]

## 2020-01-29 NOTE — ANESTHESIA CARE TRANSFER NOTE
Patient: Traci Weiner    Procedure(s):  HYSTERECTOMY, TOTAL, ABDOMINAL, WITH Left SALPINGECTOMY-oophorectomy  Laparotomy, lysis adhesions, combined    Diagnosis: Menometrorrhagia [N92.1]  Dysmenorrhea [N94.6]  Diagnosis Additional Information: No value filed.    Anesthesia Type:   General     Note:  Airway :Face Mask  Patient transferred to:PACU  Comments: Report to ERICK Lewis  Pt quiet, eyes open to questions, head nods answers, appears comfortable.  VSS listed on Intraop record. Temp 37.4Handoff Report: Identifed the Patient, Identified the Reponsible Provider, Reviewed the pertinent medical history, Discussed the surgical course, Reviewed Intra-OP anesthesia mangement and issues during anesthesia, Set expectations for post-procedure period and Allowed opportunity for questions and acknowledgement of understanding      Vitals: (Last set prior to Anesthesia Care Transfer)    CRNA VITALS  1/29/2020 1132 - 1/29/2020 1216      1/29/2020             Resp Rate (observed):  14     clear BBS check by RN                Electronically Signed By: YAW Hernandez CRNA  January 29, 2020  12:16 PM

## 2020-01-29 NOTE — ANESTHESIA PROCEDURE NOTES
Peripheral Nerve Block Procedure Note    Staff:     Anesthesiologist:  Frankie Rainey MD  Location: Pre-op  Procedure Start/Stop TImes:      1/29/2020 7:10 AM     1/29/2020 7:15 AM    patient identified, IV checked, site marked, risks and benefits discussed, informed consent, monitors and equipment checked, pre-op evaluation, at physician/surgeon's request and post-op pain management      Correct Patient: Yes      Correct Position: Yes      Correct Site: Yes      Correct Procedure: Yes      Correct Laterality:  Yes    Site Marked:  Yes  Procedure details:     Procedure:  TAP    ASA:  2    Laterality:  Bilateral    Position:  Supine    Sterile Prep: chloraprep, mask and sterile gloves      Local skin infiltration:  2% lidocaine    amount (mL):  3    Needle:  Short bevel    Needle gauge:  21    Needle length (mm):  110    Ultrasound: Yes      Ultrasound used to identify targeted nerve, plexus, or vascular structure and placed a needle adjacent to it      Permanent Image entered into patiient's record      Abnormal pain on injection: No      Blood Aspirated: No      Paresthesias:  No    Bleeding at site: No      Bolus via:  Needle    Infusion Method:  Single Shot    Blood aspirated via catheter: No      Complications:  None  Assessment/Narrative:     Injection made incrementally with aspirations every (mL):  5

## 2020-01-29 NOTE — ANESTHESIA POSTPROCEDURE EVALUATION
Anesthesia POST Procedure Evaluation    Patient: Traci Weiner   MRN:     [id number] Gender:   female   Age:    39 year old :      1980        Preoperative Diagnosis: Menometrorrhagia [N92.1]  Dysmenorrhea [N94.6]   Procedure(s):  HYSTERECTOMY, TOTAL, ABDOMINAL, WITH Left SALPINGECTOMY-oophorectomy  Laparotomy, lysis adhesions, combined   Postop Comments: No value filed.       Anesthesia Type:  Not documented  General    Reportable Event: NO     PAIN: Uncomplicated   Sign Out status: Comfortable, Well controlled pain     PONV: No PONV   Sign Out status:  No Nausea or Vomiting     Neuro/Psych: Uneventful perioperative course   Sign Out Status: Preoperative baseline; Age appropriate mentation     Airway/Resp.: Uneventful perioperative course   Sign Out Status: Non labored breathing, age appropriate RR; Resp. Status within EXPECTED Parameters     CV: Uneventful perioperative course   Sign Out status: Appropriate BP and perfusion indices; Appropriate HR/Rhythm     Disposition:   Sign Out in:  PACU  Disposition:  Phase II; Home  Recovery Course: Uneventful  Follow-Up: Not required           Last Anesthesia Record Vitals:  CRNA VITALS  2020 1132 - 2020 1231      2020             Resp Rate (observed):  14     clear BBS check by RN          Last PACU Vitals:  Vitals Value Taken Time   /83 2020 12:15 PM   Temp 37.4  C (99.4  F) 2020 12:04 PM   Pulse 75 2020 12:15 PM   Resp 18 2020 12:30 PM   SpO2 99 % 2020 12:30 PM   Temp src     NIBP 133/83 2020 12:05 PM   Pulse     SpO2 100 % 2020 12:05 PM   Resp     Temp 37.5  C (99.5  F) 2020 12:05 PM   Ht Rate 76 2020 12:05 PM   Temp 2     Vitals shown include unvalidated device data.      Electronically Signed By: Halle Campo MD, 2020, 12:31 PM

## 2020-01-29 NOTE — ANESTHESIA PREPROCEDURE EVALUATION
Anesthesia Pre-Procedure Evaluation    Patient: Traci Weiner   MRN:     [id number] Gender:   female   Age:    39 year old :      1980        Preoperative Diagnosis: Menometrorrhagia [N92.1]  Dysmenorrhea [N94.6]   Procedure(s):  HYSTERECTOMY, TOTAL, ABDOMINAL, WITH BILATERAL SALPINGECTOMY     Past Medical History:   Diagnosis Date     Depressive disorder       Past Surgical History:   Procedure Laterality Date     APPENDECTOMY  2016     CHOLECYSTECTOMY  2004    gall bladder removed     GYN SURGERY       X 4          Anesthesia Evaluation     .             ROS/MED HX    ENT/Pulmonary:  - neg pulmonary ROS     Neurologic:  - neg neurologic ROS     Cardiovascular:  - neg cardiovascular ROS   (+) ----. : . . . :. valvular problems/murmurs .       METS/Exercise Tolerance:     Hematologic:  - neg hematologic  ROS       Musculoskeletal:  - neg musculoskeletal ROS       GI/Hepatic:  - neg GI/hepatic ROS       Renal/Genitourinary:  - ROS Renal section negative       Endo:  - neg endo ROS       Psychiatric:     (+) psychiatric history depression      Infectious Disease:  - neg infectious disease ROS       Malignancy:      - no malignancy   Other:                         PHYSICAL EXAM:   Mental Status/Neuro: A/A/O   Airway: Facies: Feasible  Mallampati: III  Mouth/Opening: Full  TM distance: > 6 cm  Neck ROM: Full   Respiratory: Auscultation: CTAB     Resp. Rate: Normal     Resp. Effort: Normal      CV: Rhythm: Regular  Rate: Age appropriate  Heart: Normal Sounds  Edema: None   Comments:      Dental: Normal Dentition                LABS:  CBC:   Lab Results   Component Value Date    WBC 5.8 2020    WBC 8.1 2019    HGB 12.0 2020    HGB 12.0 2019    HCT 37.7 2020    HCT 37.6 2019     (L) 2020     (L) 2019     BMP: No results found for: NA, POTASSIUM, CHLORIDE, CO2, BUN, CR, GLC  COAGS: No results found for: PTT, INR, FIBR  POC:   Lab Results  "  Component Value Date    HCG Negative 11/27/2019    HCGS Negative 08/10/2017     OTHER:   Lab Results   Component Value Date    TSH 1.49 08/09/2019        Preop Vitals    BP Readings from Last 3 Encounters:   12/30/19 111/80   11/27/19 110/70   09/25/19 102/69    Pulse Readings from Last 3 Encounters:   12/30/19 82   11/27/19 66   09/25/19 71      Resp Readings from Last 3 Encounters:   No data found for Resp    SpO2 Readings from Last 3 Encounters:   11/27/19 98%   09/25/19 99%   08/20/19 100%      Temp Readings from Last 1 Encounters:   12/30/19 36.7  C (98  F) (Oral)    Ht Readings from Last 1 Encounters:   12/30/19 1.698 m (5' 6.85\")      Wt Readings from Last 1 Encounters:   12/30/19 81.2 kg (179 lb)    Estimated body mass index is 28.16 kg/m  as calculated from the following:    Height as of 12/30/19: 1.698 m (5' 6.85\").    Weight as of 12/30/19: 81.2 kg (179 lb).     LDA:        Assessment:   ASA SCORE: 2    H&P: History and physical reviewed and following examination; no interval change.    NPO Status: NPO Appropriate     Plan:   Anes. Type:  General   Pre-Medication: None   Induction:  IV (Standard)   Airway: ETT; Oral   Access/Monitoring: PIV   Maintenance: Balanced     Postop Plan:   Postop Pain: Opioids  Postop Sedation/Airway: Not planned  Disposition: Inpatient/Admit     PONV Management:   Adult Risk Factors: Female, Postop Opioids   Prevention: Ondansetron     CONSENT: Direct conversation   Plan and risks discussed with: Patient   Blood Products: Consented (ALL Blood Products)                   Halle Campo MD  "

## 2020-01-30 LAB
ERYTHROCYTE [DISTWIDTH] IN BLOOD BY AUTOMATED COUNT: 13.1 % (ref 10–15)
HCT VFR BLD AUTO: 28.4 % (ref 35–47)
HGB BLD-MCNC: 9.2 G/DL (ref 11.7–15.7)
MCH RBC QN AUTO: 29 PG (ref 26.5–33)
MCHC RBC AUTO-ENTMCNC: 32.4 G/DL (ref 31.5–36.5)
MCV RBC AUTO: 90 FL (ref 78–100)
PLATELET # BLD AUTO: 110 10E9/L (ref 150–450)
RBC # BLD AUTO: 3.17 10E12/L (ref 3.8–5.2)
WBC # BLD AUTO: 13 10E9/L (ref 4–11)

## 2020-01-30 PROCEDURE — 25000128 H RX IP 250 OP 636: Performed by: STUDENT IN AN ORGANIZED HEALTH CARE EDUCATION/TRAINING PROGRAM

## 2020-01-30 PROCEDURE — 25800030 ZZH RX IP 258 OP 636: Performed by: STUDENT IN AN ORGANIZED HEALTH CARE EDUCATION/TRAINING PROGRAM

## 2020-01-30 PROCEDURE — 85027 COMPLETE CBC AUTOMATED: CPT | Performed by: STUDENT IN AN ORGANIZED HEALTH CARE EDUCATION/TRAINING PROGRAM

## 2020-01-30 PROCEDURE — 93005 ELECTROCARDIOGRAM TRACING: CPT

## 2020-01-30 PROCEDURE — 93010 ELECTROCARDIOGRAM REPORT: CPT | Performed by: STUDENT IN AN ORGANIZED HEALTH CARE EDUCATION/TRAINING PROGRAM

## 2020-01-30 PROCEDURE — 36415 COLL VENOUS BLD VENIPUNCTURE: CPT | Performed by: STUDENT IN AN ORGANIZED HEALTH CARE EDUCATION/TRAINING PROGRAM

## 2020-01-30 PROCEDURE — 25000132 ZZH RX MED GY IP 250 OP 250 PS 637: Performed by: STUDENT IN AN ORGANIZED HEALTH CARE EDUCATION/TRAINING PROGRAM

## 2020-01-30 PROCEDURE — 12000001 ZZH R&B MED SURG/OB UMMC

## 2020-01-30 RX ORDER — IBUPROFEN 600 MG/1
600 TABLET, FILM COATED ORAL EVERY 6 HOURS PRN
Status: DISCONTINUED | OUTPATIENT
Start: 2020-01-30 | End: 2020-01-31 | Stop reason: HOSPADM

## 2020-01-30 RX ADMIN — ACETAMINOPHEN 975 MG: 325 TABLET, FILM COATED ORAL at 20:05

## 2020-01-30 RX ADMIN — ACETAMINOPHEN 975 MG: 325 TABLET, FILM COATED ORAL at 12:08

## 2020-01-30 RX ADMIN — ACETAMINOPHEN 975 MG: 325 TABLET, FILM COATED ORAL at 05:36

## 2020-01-30 RX ADMIN — KETOROLAC TROMETHAMINE 30 MG: 30 INJECTION, SOLUTION INTRAMUSCULAR at 08:10

## 2020-01-30 RX ADMIN — KETOROLAC TROMETHAMINE 30 MG: 30 INJECTION, SOLUTION INTRAMUSCULAR at 01:07

## 2020-01-30 RX ADMIN — SODIUM CHLORIDE, POTASSIUM CHLORIDE, SODIUM LACTATE AND CALCIUM CHLORIDE: 600; 310; 30; 20 INJECTION, SOLUTION INTRAVENOUS at 10:11

## 2020-01-30 RX ADMIN — OXYCODONE HYDROCHLORIDE 5 MG: 5 TABLET ORAL at 20:05

## 2020-01-30 RX ADMIN — OXYCODONE HYDROCHLORIDE 5 MG: 5 TABLET ORAL at 05:37

## 2020-01-30 RX ADMIN — KETOROLAC TROMETHAMINE 30 MG: 30 INJECTION, SOLUTION INTRAMUSCULAR at 14:13

## 2020-01-30 RX ADMIN — SODIUM CHLORIDE, POTASSIUM CHLORIDE, SODIUM LACTATE AND CALCIUM CHLORIDE: 600; 310; 30; 20 INJECTION, SOLUTION INTRAVENOUS at 01:07

## 2020-01-30 RX ADMIN — HYDROMORPHONE HYDROCHLORIDE 0.5 MG: 1 INJECTION, SOLUTION INTRAMUSCULAR; INTRAVENOUS; SUBCUTANEOUS at 01:19

## 2020-01-30 ASSESSMENT — ACTIVITIES OF DAILY LIVING (ADL)
ADLS_ACUITY_SCORE: 12
ADLS_ACUITY_SCORE: 13
ADLS_ACUITY_SCORE: 12
ADLS_ACUITY_SCORE: 10
ADLS_ACUITY_SCORE: 10
ADLS_ACUITY_SCORE: 12

## 2020-01-30 NOTE — PLAN OF CARE
"      VS:   BP (!) 165/91 (BP Location: Left arm)   Pulse 68   Temp 97.8  F (36.6  C) (Oral)   Resp 13   Ht 1.698 m (5' 6.85\")   Wt 82.9 kg (182 lb 12.2 oz)   LMP 01/10/2020 (Exact Date)   SpO2 96%   BMI 28.75 kg/m       Output:   Vieira putting out adequate amount of urine. LBM 1/28. Not passing gas yet   Lungs CTA-dimished   Activity:   Moves in bed, has not gotten up yet.   Skin: Intact ex incision   Pain:   Tolerable with discomfort. Scheduled toradol.   Neuro/CMS:   A&Ox3, denies numbness and tingling   Dressing(s):   CDI   Diet:   Regular-poor diet. Nausea and upset stomach   LDA:   R PIV infusing, vieira   Equipment:   IV pole, Continous pulse ox, PCD's   Plan:   Continue to monitor and follow plan of care. Able to make needs known and call light within reach.   Additional Info:          "

## 2020-01-30 NOTE — PLAN OF CARE
"      VS:   /82 (BP Location: Right arm)   Pulse 71   Temp 99.3  F (37.4  C) (Oral)   Resp 14   Ht 1.698 m (5' 6.85\")   Wt 82.9 kg (182 lb 12.2 oz)   LMP 01/10/2020 (Exact Date)   SpO2 100%   BMI 28.75 kg/m       Output:   Vieira output of 850 mL. LBM prior to surgery on 1/28   Activity:   Assist x1, up in bed   Skin: Intact ex incision   Pain:   Incisional pain managed with PO oxy, toradol, and tylenol   Neuro/CMS:   A&O x 4, denies any numbness or tingling   Dressing(s):   CDI   Diet:   Regular- tolerating clear fluids and gram crackers    LDA:   PIV R wrist 125 mL/hr LR, vieira   Equipment:   IV pole, PCD's, continuous pulse ox    Plan:   Continue to monitor and follow plan of care. Call light within reach. Pt able to make needs known   Additional Info:   One occurrence of emesis overnight, pt denies any nausea since       "

## 2020-01-30 NOTE — PLAN OF CARE
1/30  07:30-15:30  VS: Stable   O2 Sats 99% on room air  Lungs CTA   Output: Pena catheter removed @ 14:15 per OB request.  DTV 18:00   Bowels/BM BS + all quadrants, sluggish, passing gas  Last BM 1/28  Pt declined senokot-s, explained reason for stool softener, pt still refused.   Activity Ambulated 275 feet in verdin   Skin: Intact except for surgical incision lower abdomen   Pain: Surgical site pain managed with  Oxycodone q 3 hours prn  Scheduled toradol and tylenol   Neuro/CMS: Intact     Dressing: Gauze dressing lower abdomen, light shadowing on right side of dressing.  Dressing intact   Diet: Regular  No nausea this shift, tolerating liquids and crackers this morning   LDA: PIV   Right wrist, LR infusing @ 125 ml/hr   Equipment: IV pump and pole   Plan:    Additional Info: cper OB, pt may shower this evening and remove dressing

## 2020-01-30 NOTE — PROGRESS NOTES
[facility]  Gynecology Progress Note      S:  Patient states she is overall doing well. Reports abdominal pain is improving. Well controlled with PO medications. Has passed flatus. Pena still in place. Patient sat at edge of bed without difficulty or dizziness. Has not ambulated. Has single episode of emesis last night. Was not associated with PO intake. Otherwise has tolerated liquids and bites of crackers without nausea or vomiting.    O:  Patient Vitals for the past 24 hrs:   BP Temp Temp src Pulse Heart Rate Resp SpO2   01/30/20 0047 132/82 99.3  F (37.4  C) Oral 71 69 14 100 %   01/29/20 1830 (!) 165/91 97.8  F (36.6  C) Oral 68 -- 13 96 %   01/29/20 1730 138/74 -- -- 63 -- 19 95 %   01/29/20 1615 (!) 149/74 -- -- 64 -- 23 100 %   01/29/20 1545 131/79 -- -- 65 -- 17 99 %   01/29/20 1455 136/76 -- -- 64 -- 16 99 %   01/29/20 1440 125/76 -- -- 63 -- 14 99 %   01/29/20 1425 130/76 -- -- 62 -- 15 98 %   01/29/20 1410 (!) 149/86 -- -- -- -- -- 98 %   01/29/20 1355 (!) 152/72 -- -- 66 -- 14 100 %   01/29/20 1353 (!) 147/85 97.4  F (36.3  C) Oral 66 -- 14 99 %   01/29/20 1329 -- -- -- -- -- -- 99 %   01/29/20 1315 132/88 -- -- 70 67 12 98 %   01/29/20 1308 -- 98.8  F (37.1  C) Axillary -- 67 15 98 %   01/29/20 1300 -- 98.8  F (37.1  C) Axillary -- 69 14 98 %   01/29/20 1245 131/83 -- -- 69 72 11 99 %   01/29/20 1230 (!) 145/82 99.3  F (37.4  C) Axillary 75 76 18 99 %   01/29/20 1215 127/83 -- -- 75 76 18 100 %   01/29/20 1204 133/83 99.4  F (37.4  C) Axillary 78 -- 16 100 %   01/29/20 0715 115/68 -- -- 82 74 17 100 %   01/29/20 0710 114/68 -- -- -- 74 17 100 %   01/29/20 0706 121/83 -- -- 72 74 12 98 %     Gen: Resting comfortably, NAD  CV: RRR, no murmurs  Pulm: CTAB, no wheezes  Abd: Soft, appropriately ttp, non-distended, +BS  Inc: Dressing in place with light shadowing on right hand aspect. No erythema or induration  Ext: SCDs in place, scant LE edema b/l    Urine output:  >100  ml/hour    CBC RESULTS:   Recent Labs   Lab Test 20  0611   WBC 13.0*   RBC 3.17*   HGB 9.2*   HCT 28.4*   MCV 90   MCH 29.0   MCHC 32.4   RDW 13.1   *     A/P:  Ms. Traci Weiner is a 39 year old on POD #1 s/p TERRENCE-BS. Overall doing well. Has not ambulated. Vieira still in place. Single episode of emesis last night.     Depression  Stable  - Abilify 2 mg daily  - Zoloft 50 mg daily    Postoperative care  Pain: - Continue Ibuprofen and Tylenol, oxycodone prn  FEN/GI:- Advance diet as tolerated   - Wean IVF when tolerating adequate PO  CV: - mildly hypertensive to normotensive. Pulse within normal limits  Pulm: - No active issues  : - monitor UOP. Vieira out this am and follow up trial of void  Heme: - Hgb 12.0 > EBL 1020 mL > 10.5 > 9.2  PPX: - SCDs in place, encourage IS, bowel regimen in place  Dispo: - In house for routine postop care.  Likely d/c POD#2    Francesca Golden MD  OB/GYN, PGY-1  2020, 6:40 AM    Physician Attestation   I, Marbella Hamilton MD, personally examined and evaluated this patient.  I discussed the patient with the resident/fellow and care team, and agree with the assessment and plan of care as documented in the note of 2020.      I personally reviewed vital signs, medications and labs.    La findings: Traci Weiner is a 39 year old  POD#1 s/p TERRENCE, LSO, extensive lysis of adhesions for AUB and dysmenorrhea currently recovering well post-op.  Patient has ambulated the verdin without dizziness, tolerating regular diet, passing flatus, and pain controlled.  She is getting her vieira catheter out now.  Follow up trial of void.  Likely discharge to home tomorrow.   Marbella Hamilton MD  Date of Service (when I saw the patient): 20

## 2020-01-30 NOTE — PHARMACY-ADMISSION MEDICATION HISTORY
Admission Medication History by pharmacy is complete.  See EPIC admission navigator for Prior to Admission medications.     Medication History Sources:  Patient interview and sure scripts dispense report.      Medication History Source Reliability: Good; Sure scripts dispense report and patient interview aligned accordingly.      Medication Adherence: Good; Patient reports taking medications as prescribed by provider.     Current Outpatient Pharmacy:    [facility] [id number] [place], [place] - [address]  [id number] - [place], [place] - [address] [facility] [id number] - [facility] [address]  [facility] [place], [place] - [address]     Changes made to PTA medication list (reason)  Added: NONE    Deleted:     Ferrous gluconate: Per patient, not taking.     Changed: NONE     Additional medication history information (including reliability of information, actions taken by pharmacist):   Medication Adherence:     Patient reports that she is currently taking Abilify and Zoloft as prescribed by provider.     Per sure scripts, patient recently filled both prescriptions for a 30 day supply on 01/25/20.     Per sure scripts, patient's fill history shows consistent fills.      Prior to Admission medications    Medication Sig Last Dose Taking? Auth Provider   ARIPiprazole (ABILIFY) 2 MG tablet Take 1 tablet (2 mg) by mouth daily Past Week Yes Eli Alvarado PA-C   naproxen (NAPROSYN) 500 MG tablet Take 1 tablet (500 mg) by mouth 2 times daily (with meals) Past Week Yes Natalie Armstrong MD   sertraline (ZOLOFT) 50 MG tablet Take 1 tablet (50 mg) by mouth daily Past Week Yes Eli Alvarado PA-C        Time spent in this activity: 20 minutes     Medication history completed by: Haydee Forman, Student Pharmacist

## 2020-01-31 VITALS
HEART RATE: 77 BPM | WEIGHT: 178.5 LBS | DIASTOLIC BLOOD PRESSURE: 78 MMHG | RESPIRATION RATE: 16 BRPM | TEMPERATURE: 98.2 F | OXYGEN SATURATION: 98 % | BODY MASS INDEX: 28.02 KG/M2 | SYSTOLIC BLOOD PRESSURE: 121 MMHG | HEIGHT: 67 IN

## 2020-01-31 LAB
COPATH REPORT: NORMAL
GLUCOSE SERPL-MCNC: 85 MG/DL (ref 70–99)
INTERPRETATION ECG - MUSE: NORMAL

## 2020-01-31 PROCEDURE — 36416 COLLJ CAPILLARY BLOOD SPEC: CPT | Performed by: STUDENT IN AN ORGANIZED HEALTH CARE EDUCATION/TRAINING PROGRAM

## 2020-01-31 PROCEDURE — 25000132 ZZH RX MED GY IP 250 OP 250 PS 637: Performed by: STUDENT IN AN ORGANIZED HEALTH CARE EDUCATION/TRAINING PROGRAM

## 2020-01-31 PROCEDURE — 82947 ASSAY GLUCOSE BLOOD QUANT: CPT | Performed by: STUDENT IN AN ORGANIZED HEALTH CARE EDUCATION/TRAINING PROGRAM

## 2020-01-31 RX ORDER — OXYCODONE HYDROCHLORIDE 5 MG/1
5 TABLET ORAL
Qty: 12 TABLET | Refills: 0 | Status: SHIPPED | OUTPATIENT
Start: 2020-01-31 | End: 2021-04-12

## 2020-01-31 RX ORDER — IBUPROFEN 600 MG/1
600 TABLET, FILM COATED ORAL EVERY 6 HOURS PRN
Qty: 40 TABLET | Refills: 0 | Status: SHIPPED | OUTPATIENT
Start: 2020-01-31 | End: 2020-01-31

## 2020-01-31 RX ORDER — SENNA AND DOCUSATE SODIUM 50; 8.6 MG/1; MG/1
1 TABLET, FILM COATED ORAL AT BEDTIME
Qty: 40 TABLET | Refills: 0 | Status: SHIPPED | OUTPATIENT
Start: 2020-01-31 | End: 2021-04-12

## 2020-01-31 RX ORDER — ACETAMINOPHEN 325 MG/1
325-650 TABLET ORAL EVERY 6 HOURS PRN
Qty: 40 TABLET | Refills: 0 | Status: SHIPPED | OUTPATIENT
Start: 2020-01-31 | End: 2021-04-12

## 2020-01-31 RX ADMIN — ACETAMINOPHEN 975 MG: 325 TABLET, FILM COATED ORAL at 11:49

## 2020-01-31 RX ADMIN — ACETAMINOPHEN 975 MG: 325 TABLET, FILM COATED ORAL at 04:03

## 2020-01-31 ASSESSMENT — ACTIVITIES OF DAILY LIVING (ADL)
ADLS_ACUITY_SCORE: 10

## 2020-01-31 ASSESSMENT — MIFFLIN-ST. JEOR: SCORE: 1504.91

## 2020-01-31 NOTE — PROGRESS NOTES
[facility]  Gynecology Progress Note      S:  Patient doing overall well this morning. Episode of chest pain last night that resolved with TUMS. Pain well controlled with PO regimen. Passing flatus and voiding spontaneously. Has not had a bowel movement. Tolerating regular diet without nausea or vomiting. Ambulating without dizziness. Reports a headache consistent with caffeine headaches she experiences at home. She has thus far tried tea for this. She feels ready to go home today.    O:  Patient Vitals for the past 24 hrs:   BP Temp Temp src Pulse Heart Rate Resp SpO2   01/31/20 0003 100/62 99  F (37.2  C) Oral 77 -- 16 98 %   01/30/20 1928 130/65 -- -- -- 79 -- 98 %   01/30/20 1906 121/60 98.3  F (36.8  C) Oral -- 83 -- --   01/30/20 1610 113/63 98.1  F (36.7  C) Oral -- 82 16 96 %   01/30/20 0800 108/64 99.1  F (37.3  C) Oral -- 78 16 99 %     Gen: Resting comfortably, NAD  CV: RRR, no murmurs  Pulm: CTAB, no wheezes  Abd: Soft, appropriately ttp, non-distended, +BS  Inc: Dressing removed. Steri strips in place. Clean dry and intact. No erythema or induration  Ext: SCDs in place, scant LE edema b/l    CBC RESULTS:   Lab Test 01/30/20  0611   WBC 13.0*   RBC 3.17*   HGB 9.2*   HCT 28.4*   MCV 90   MCH 29.0   MCHC 32.4   RDW 13.1   *     EKG: normal sinus rhythm     A/P:  Ms. Traci Weiner is a 39 year old on POD #2 s/p TERRENCE-BS. Overall doing well. Meeting all postoperative milestones.      Depression  Stable  - Abilify 2 mg daily  - Zoloft 50 mg daily    Postoperative care  Pain: - Continue Ibuprofen and Tylenol, oxycodone prn  FEN/GI:- Advance diet as tolerated   - Wean IVF when tolerating adequate PO  CV: - mildly hypertensive to normotensive. Pulse within normal limits  Pulm: - No active issues  : - monitor UOP. Pena out this am and follow up trial of void  Heme: - Hgb 12.0 > EBL 1020 mL > 10.5 > 9.2  PPX: - SCDs in place, encourage IS, bowel regimen in place  Dispo: -  Home today.    Francesca Golden MD  OB/GYN, PGY-1  1/31/2020. 7:00 AM    Patient seen and examined by me, agree with above resident note. Overall doing well and ok for discharge.  Instructions given.  RTC 2 weeks for post op check.    VIVIENNE ARANGO MD

## 2020-01-31 NOTE — PROGRESS NOTES
Pt VSS. Dressings CDI denies pain Discharge medications and instructions reviewed. Pt discharged home via medicab

## 2020-01-31 NOTE — PLAN OF CARE
VS:   VSS.   Pt reported right shoulder pain and chest pain at 1945. Vitals were stable and EKG obtained- normal sinus rhythm. Tums given. Chest pain resolved throughout the evening. Denies shortness of breath.    Output:   Voiding adequate amounts. PVR WDL.   Bowel sounds active, passing flatus.    Activity:   Up independently. Steady on feet. Pt able to reposition self in bed.    Skin/Dressing: Intact except surgical incision- dressing intact, noted small shadowing on right side of dressing.    Pain:   Managed with scheduled Tylenol and PRN oxycodone given once. Using hot packs to right shoulder for comfort.    Neuro/CMS:   Alert and oriented x4.   Denies numbness and tingling.    Diet:   Tolerating regular diet well. Denies nausea.    IV's/Drains:   PIV saline locked    Plan:   Continue plan of care and pain management. Pt able to make all needs known, call light in reach.

## 2020-01-31 NOTE — PLAN OF CARE
Problem: Adult Inpatient Plan of Care  Goal: Plan of Care Review  1/31/2020 0153 by Reba Jung RN  Outcome: No Change   Note for the night shift.  D: Sleeping well between checks tonight. Drsg is CDI with small amt of shadow drainage on the right side of the drsg. Says she is passing flatus. Voiding ok. About 0400 awake with bad headache. Pt says it is a caffiene headache.Drank a little tea, took her Tylenol and ice pack to her head. Slept till AM when the Dr.came in. Drsg removed by the Dr's this AM. Ordered Coke this AM to see it it would help her headache. Pt plans to go home today. Pt will shower when she gets home.  Call light with in reach. Able to make needs known.  A; Doing OK. P: Monitor closely.  Supportive cares. Discharged today.  Medicate for pain as needed.Encourage activity today.

## 2020-01-31 NOTE — TELEPHONE ENCOUNTER
Type of outreach:    Sent final hm letter.    Questions for provider review:    None                                                                                                                                    GAYATRI Fountain MA       Chart routed to Care Team .

## 2020-01-31 NOTE — PROVIDER NOTIFICATION
"Pt reported new chest pain/pressure radiating to her right shoulder. Pt denies shortness of breath. Vitals signs are  /65 (BP Location: Right arm)   Pulse 71   Temp 98.3  F (36.8  C) (Oral)   Resp 16   Ht 1.698 m (5' 6.85\")   Wt 82.9 kg (182 lb 12.2 oz)   LMP 01/10/2020 (Exact Date)   SpO2 96%   BMI 28.75 kg/m  . MD notified of new chest pain and stat EKG ordered.     "

## 2020-02-03 ENCOUNTER — TRANSFERRED RECORDS (OUTPATIENT)
Dept: HEALTH INFORMATION MANAGEMENT | Facility: CLINIC | Age: 40
End: 2020-02-03

## 2020-02-03 ENCOUNTER — TELEPHONE (OUTPATIENT)
Dept: OBGYN | Facility: CLINIC | Age: 40
End: 2020-02-03

## 2020-02-03 ENCOUNTER — TELEPHONE (OUTPATIENT)
Dept: FAMILY MEDICINE | Facility: CLINIC | Age: 40
End: 2020-02-03

## 2020-02-03 LAB
ALT SERPL-CCNC: 13 IU/L (ref 8–45)
AST SERPL-CCNC: 15 IU/L (ref 2–40)

## 2020-02-03 NOTE — TELEPHONE ENCOUNTER
RN spoke with patient. She states she has been readmitted to [facility] currently. Will follow up after discharge    ED/Discharge Protocol    Post Discharge Medication Reconciliation Status: discharge medications reconciled and changed, per note/orders (see AVS).    Shauna Ruth RN, BSN, PHN  [facility]: [place]

## 2020-02-03 NOTE — TELEPHONE ENCOUNTER
"Patient stated that she had a hysterectomy last Wednesday and was discharged on Friday. Patient stated that today she is having heavy vaginal bleeding - \"I am sitting on the toilet and it feels like I am peeing but it is blood. It just keeps coming out\". Informed patient that is too much blood to be losing and she needs to go in through ER for evaluation. Suggest RD ER, but if she cannot come here, closest ER is okay. Patient will go now.  Ligia Garcia RN    " Please print the order for ENT and get an approval with patient insurance and call her to  today or tomorrow.

## 2020-02-03 NOTE — TELEPHONE ENCOUNTER
This patient was discharged from [facility] on 01/31/2020.    Discharge Diagnosis: Menometrorrhagia, Dysmenorrhea, S/P Abdominal Hysterectomy    Follow-up instructions: She will follow up with Dr. Armstrong in 4-6 weeks for postoperative visit    A follow-up visit has not been scheduled.      Please follow-up with patient.

## 2020-02-04 LAB
CREAT SERPL-MCNC: 0.53 MG/DL (ref 0.57–1.11)
GFR SERPL CREATININE-BSD FRML MDRD: >60 ML/MIN/1.73M2
GLUCOSE SERPL-MCNC: 95 MG/DL (ref 65–100)
INR PPP: 1.5

## 2020-02-06 LAB — POTASSIUM SERPL-SCNC: 3.6 MMOL/L (ref 3.5–5)

## 2020-02-07 ENCOUNTER — TELEPHONE (OUTPATIENT)
Dept: OBGYN | Facility: CLINIC | Age: 40
End: 2020-02-07

## 2020-02-07 NOTE — TELEPHONE ENCOUNTER
----- Message from Natalie Armstrong MD sent at 2/6/2020  5:24 PM CST -----  Regarding: FW: f/u  Please put  on my schedule for that day and time. Thanks.   ----- Message -----  From: Natalie Armstrong MD  Sent: 2/6/2020  11:24 AM CST  To: Natalie Armstrong MD  Subject: f/u                                              Appt Tuesday at [facility] 2/11  At 4:45

## 2020-02-12 ENCOUNTER — TELEPHONE (OUTPATIENT)
Dept: OBGYN | Facility: CLINIC | Age: 40
End: 2020-02-12

## 2020-02-12 DIAGNOSIS — N73.9 PELVIC ABSCESS IN FEMALE: Primary | ICD-10-CM

## 2020-02-12 RX ORDER — AMOXICILLIN AND CLAVULANATE POTASSIUM 600; 42.9 MG/5ML; MG/5ML
875 POWDER, FOR SUSPENSION ORAL 2 TIMES DAILY
Qty: 204.4 ML | Refills: 0 | Status: SHIPPED | OUTPATIENT
Start: 2020-02-12 | End: 2020-02-26

## 2020-02-12 RX ORDER — METRONIDAZOLE 500 MG/1
500 TABLET ORAL 2 TIMES DAILY
Qty: 28 TABLET | Refills: 0 | Status: SHIPPED | OUTPATIENT
Start: 2020-02-12 | End: 2020-02-26

## 2020-02-12 NOTE — TELEPHONE ENCOUNTER
Call to patient to follow up on her pelvic drain.   Patient admitted to [facility] from 2/3 -2/6 for post op pelvic abscess.    A drain was placed in the pelvis and left in place.   Patient was put on augmentin.   She went back to [facility] IR dept yesterday and they did a study which showed the pelvic fluid collection is down to 2.9 cm from 7.6 cm.   The drain is putting out 10 ml / day for the past 3 days.    They switched out the drain and left the new one in place.   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  She is only on augmentin.   Wants the drain out.   I called to speak with IR MD who did the study and he should be calling me back today.   I called Traci and she stated that she is not able to take the augmenting because the pill is too big and she can't swallow it or throws it up.   I have sent her a rx for liquid augmentin and flagyl pills x 14 days each  I stressed the importance of her taking the antibiotics to make the infection go away.   She will have a follow up appt with Dr Willian Kerr in out patient clinic at [facility] in ~ 1 wk.    She has recommended that patient get a CRP test.    Natalie Armstrong MD

## 2020-02-12 NOTE — TELEPHONE ENCOUNTER
Called Dr. Kerr's office. Next available is on 3/12. They will send a message to her and her care team to see when they can get her in, sooner. They will call patient to schedule when a message is sent back.    Called patient and spoke with her. She is aware of all information. Aware that CBC and CRP was requested to be done today. States she is in too much pain to get up and go to the lab. Offered a visit tomorrow, will call back when she knows how she is feeling.     Routing as an FYI.

## 2020-02-13 ENCOUNTER — PRE VISIT (OUTPATIENT)
Dept: INFECTIOUS DISEASES | Facility: CLINIC | Age: 40
End: 2020-02-13

## 2020-02-13 NOTE — TELEPHONE ENCOUNTER
RECORDS RECEIVED FROM: Internal - Pelvic Abscess per Lissette   DATE RECEIVED: 02.13.2020   NOTES (Gather within 2 years) STATUS DETAILS   OFFICE NOTE from referring provider   Internal 02.12.2020 Dr. Garcia   OFFICE NOTE from other specialist N/A    DISCHARGE SUMMARY from hospital Internal 02.03.2020   DISCHARGE REPORT from the ER N/A    LABS (any labs) Internal / CE    MEDICATION LIST Internal / CE    IMAGING  (NEED IMAGES AND REPORTS)     Osteomyelitis: Foot imaging  N/A    Liver Abscess: Abdominal imaging N/A    Other (anything related to diagnoses N/A

## 2020-02-14 NOTE — TELEPHONE ENCOUNTER
"Received this message: Casey Villatoro, I have a 4pm opening today or Monday. Would Traci be able to make either of those?   RANJAN Li, RN, PHN   Infectious Disease Clinic   [facility]   [phone]     Called to help get patient scheduled, son answered phone and stated she was currently at [place] \"for her catheter\". Noting in chart as FYI.  "

## 2020-02-17 NOTE — TELEPHONE ENCOUNTER
Routing to Dr. Armstrong- received this message:   Hi Shauna, Does she still need to be seen in our clinic? I see she was scheduled with us on 2/13, but cancelled and said she was going to go to [place] and get blood drawn instead?   ERICK Salgado    Routing comment

## 2020-03-12 NOTE — TELEPHONE ENCOUNTER
Called pt and LVM asking her if she still needs an apt in our Infectious Disease Clinic. Left direct line for her to call back.  Lissette Dumont RN

## 2020-11-22 NOTE — TELEPHONE ENCOUNTER
No evidence of chlamydia or gonorrhea infection    Please inform patient    Adithya Caldera MD     information could not be obtained

## 2021-01-15 ENCOUNTER — HEALTH MAINTENANCE LETTER (OUTPATIENT)
Age: 41
End: 2021-01-15

## 2021-04-12 ENCOUNTER — OFFICE VISIT (OUTPATIENT)
Dept: FAMILY MEDICINE | Facility: CLINIC | Age: 41
End: 2021-04-12
Payer: COMMERCIAL

## 2021-04-12 VITALS
TEMPERATURE: 99.2 F | DIASTOLIC BLOOD PRESSURE: 80 MMHG | BODY MASS INDEX: 25.55 KG/M2 | HEART RATE: 83 BPM | SYSTOLIC BLOOD PRESSURE: 126 MMHG | OXYGEN SATURATION: 97 % | WEIGHT: 162.8 LBS | HEIGHT: 67 IN

## 2021-04-12 DIAGNOSIS — Z11.3 SCREENING FOR STD (SEXUALLY TRANSMITTED DISEASE): ICD-10-CM

## 2021-04-12 DIAGNOSIS — Z00.00 ROUTINE GENERAL MEDICAL EXAMINATION AT A HEALTH CARE FACILITY: Primary | ICD-10-CM

## 2021-04-12 DIAGNOSIS — F32.0 MILD MAJOR DEPRESSION (H): ICD-10-CM

## 2021-04-12 PROBLEM — N92.0 HEAVY MENSTRUAL BLEEDING: Status: RESOLVED | Noted: 2019-10-08 | Resolved: 2021-04-12

## 2021-04-12 PROBLEM — N94.6 DYSMENORRHEA: Status: RESOLVED | Noted: 2019-09-25 | Resolved: 2021-04-12

## 2021-04-12 PROBLEM — N83.292 OTHER OVARIAN CYST, LEFT SIDE: Status: RESOLVED | Noted: 2019-09-25 | Resolved: 2021-04-12

## 2021-04-12 PROBLEM — N92.1 MENOMETRORRHAGIA: Status: RESOLVED | Noted: 2019-09-25 | Resolved: 2021-04-12

## 2021-04-12 PROBLEM — Z12.39 SCREENING FOR BREAST CANCER: Status: RESOLVED | Noted: 2019-09-25 | Resolved: 2021-04-12

## 2021-04-12 LAB
HCV AB SERPL QL IA: NONREACTIVE
HIV 1+2 AB+HIV1 P24 AG SERPL QL IA: NONREACTIVE
T PALLIDUM AB SER QL: NONREACTIVE

## 2021-04-12 PROCEDURE — 99000 SPECIMEN HANDLING OFFICE-LAB: CPT | Performed by: PHYSICIAN ASSISTANT

## 2021-04-12 PROCEDURE — 87491 CHLMYD TRACH DNA AMP PROBE: CPT | Performed by: PHYSICIAN ASSISTANT

## 2021-04-12 PROCEDURE — 87591 N.GONORRHOEAE DNA AMP PROB: CPT | Performed by: PHYSICIAN ASSISTANT

## 2021-04-12 PROCEDURE — 86803 HEPATITIS C AB TEST: CPT | Performed by: PHYSICIAN ASSISTANT

## 2021-04-12 PROCEDURE — 99213 OFFICE O/P EST LOW 20 MIN: CPT | Mod: 25 | Performed by: PHYSICIAN ASSISTANT

## 2021-04-12 PROCEDURE — 86780 TREPONEMA PALLIDUM: CPT | Mod: 90 | Performed by: PHYSICIAN ASSISTANT

## 2021-04-12 PROCEDURE — 99396 PREV VISIT EST AGE 40-64: CPT | Performed by: PHYSICIAN ASSISTANT

## 2021-04-12 PROCEDURE — 87389 HIV-1 AG W/HIV-1&-2 AB AG IA: CPT | Performed by: PHYSICIAN ASSISTANT

## 2021-04-12 PROCEDURE — 36415 COLL VENOUS BLD VENIPUNCTURE: CPT | Performed by: PHYSICIAN ASSISTANT

## 2021-04-12 RX ORDER — OXYCODONE HYDROCHLORIDE 5 MG/1
5 TABLET ORAL
Qty: 12 TABLET | Refills: 0 | Status: CANCELLED | OUTPATIENT
Start: 2021-04-12

## 2021-04-12 RX ORDER — ARIPIPRAZOLE 2 MG/1
2 TABLET ORAL DAILY
Qty: 90 TABLET | Refills: 0 | Status: SHIPPED | OUTPATIENT
Start: 2021-04-12

## 2021-04-12 RX ORDER — NAPROXEN 500 MG/1
500 TABLET ORAL 2 TIMES DAILY WITH MEALS
Qty: 30 TABLET | Refills: 0 | Status: CANCELLED | OUTPATIENT
Start: 2021-04-12

## 2021-04-12 RX ORDER — ACETAMINOPHEN 325 MG/1
325-650 TABLET ORAL EVERY 6 HOURS PRN
Qty: 40 TABLET | Refills: 0 | Status: CANCELLED | OUTPATIENT
Start: 2021-04-12

## 2021-04-12 RX ORDER — ARIPIPRAZOLE 2 MG/1
2 TABLET ORAL DAILY
Qty: 90 TABLET | Refills: 0 | Status: CANCELLED | OUTPATIENT
Start: 2021-04-12

## 2021-04-12 ASSESSMENT — ENCOUNTER SYMPTOMS
PALPITATIONS: 0
MYALGIAS: 0
DIARRHEA: 0
BREAST MASS: 0
SHORTNESS OF BREATH: 0
DYSURIA: 0
EYE PAIN: 0
PARESTHESIAS: 0
DIZZINESS: 0
NAUSEA: 0
COUGH: 0
WEAKNESS: 0
ABDOMINAL PAIN: 0
CHILLS: 0
SORE THROAT: 0
HEADACHES: 0
CONSTIPATION: 0
JOINT SWELLING: 0
ARTHRALGIAS: 0
NERVOUS/ANXIOUS: 0
FEVER: 0
HEMATURIA: 0
FREQUENCY: 0
HEMATOCHEZIA: 0
HEARTBURN: 0

## 2021-04-12 ASSESSMENT — MIFFLIN-ST. JEOR: SCORE: 1431.09

## 2021-04-12 ASSESSMENT — PATIENT HEALTH QUESTIONNAIRE - PHQ9
SUM OF ALL RESPONSES TO PHQ QUESTIONS 1-9: 9
SUM OF ALL RESPONSES TO PHQ QUESTIONS 1-9: 9
10. IF YOU CHECKED OFF ANY PROBLEMS, HOW DIFFICULT HAVE THESE PROBLEMS MADE IT FOR YOU TO DO YOUR WORK, TAKE CARE OF THINGS AT HOME, OR GET ALONG WITH OTHER PEOPLE: VERY DIFFICULT

## 2021-04-12 NOTE — PATIENT INSTRUCTIONS
Please sign up at the [facility]'s Website for the COVID-19 Vaccine    https://[place].gov/covid19/vaccine/connector/connector.jsp      Patient Education     Preventive Health Recommendations  Female Ages 40 to 49    Yearly exam:     See your health care provider every year in order to  1. Review health changes.   2. Discuss preventive care.    3. Review your medicines if your doctor prescribed any.      Get a Pap test every three years (unless you have an abnormal result and your provider advises testing more often).      If you get Pap tests with HPV test, you only need to test every 5 years, unless you have an abnormal result. You do not need a Pap test if your uterus was removed (hysterectomy) and you have not had cancer.      You should be tested each year for STDs (sexually transmitted diseases), if you're at risk.     Ask your doctor if you should have a mammogram.      Have a colonoscopy (test for colon cancer) if someone in your family has had colon cancer or polyps before age 50.       Have a cholesterol test every 5 years.       Have a diabetes test (fasting glucose) after age 45. If you are at risk for diabetes, you should have this test every 3 years.    Shots: Get a flu shot each year. Get a tetanus shot every 10 years.     Nutrition:     Eat at least 5 servings of fruits and vegetables each day.    Eat whole-grain bread, whole-wheat pasta and brown rice instead of white grains and rice.    Get adequate Calcium and Vitamin D.      Lifestyle    Exercise at least 150 minutes a week (an average of 30 minutes a day, 5 days a week). This will help you control your weight and prevent disease.    Limit alcohol to one drink per day.    No smoking.     Wear sunscreen to prevent skin cancer.    See your dentist every six months for an exam and cleaning.

## 2021-04-12 NOTE — PROGRESS NOTES
"   SUBJECTIVE:   CC: Traci Weiner is an 40 year old woman who presents for preventive health visit.       Patient has been advised of split billing requirements and indicates understanding: Yes  Healthy Habits:     Getting at least 3 servings of Calcium per day:  Yes    Bi-annual eye exam:  Yes    Dental care twice a year:  NO    Sleep apnea or symptoms of sleep apnea:  None    Diet:  Regular (no restrictions)    Frequency of exercise:  None    Duration of exercise:  N/A    Taking medications regularly:  Yes    Medication side effects:  Not applicable    PHQ-2 Total Score: 4    Additional concerns today:  Yes (pt requesting STD test)    Patient notes that she has been off of medications for a long time-since before her hysterectomy. Kids note she has a volatile mood.   Sleeps horrible, up for hours.   Anxiety, crying. Anger- has been violet with friends.   No suicidal thoughts.   Alcohol use- every other day -\"Helps me go to sleep\". Drinks 3-4 shots per day- Vodka.   Smoking cigarettes-1 ppd when stressed out could be less.   Marijuana used every day. Notes the this calms her down. Helps with her anxiety.       Today's PHQ-2 Score:   PHQ-2 ( 1999 [employer]) 4/12/2021   Q1: Little interest or pleasure in doing things 2   Q2: Feeling down, depressed or hopeless 2   PHQ-2 Score 4   Q1: Little interest or pleasure in doing things More than half the days   Q2: Feeling down, depressed or hopeless More than half the days   PHQ-2 Score 4       Abuse: Current or Past (Physical, Sexual or Emotional) - No  Do you feel safe in your environment? Yes    Have you ever done Advance Care Planning? (For example, a Health Directive, POLST, or a discussion with a medical provider or your loved ones about your wishes): No, advance care planning information given to patient to review.  Patient declined advance care planning discussion at this time.    Social History     Tobacco Use     Smoking status: Current Some Day Smoker     " Packs/day: 0.30     Years: 20.00     Pack years: 6.00     Types: Cigarettes     Start date: 3/13/1997     Smokeless tobacco: Never Used   Substance Use Topics     Alcohol use: No     If you drink alcohol do you typically have >3 drinks per day or >7 drinks per week? No    Alcohol Use 4/12/2021   Prescreen: >3 drinks/day or >7 drinks/week? No   Prescreen: >3 drinks/day or >7 drinks/week? -       Reviewed orders with patient.  Reviewed health maintenance and updated orders accordingly - Yes  Labs reviewed in EPIC    Breast Cancer Screening:  Any new diagnosis of family breast, ovarian, or bowel cancer? No    FSH-7: No flowsheet data found.    Mammogram Screening - Offered annual screening and updated Health Maintenance for mutual plan based on risk factor consideration    Pertinent mammograms are reviewed under the imaging tab.    History of abnormal Pap smear: Status post benign hysterectomy. Health Maintenance and Surgical History updated.  PAP / HPV Latest Ref Rng & Units 11/2/2017   PAP - NIL   HPV 16 DNA NEG:Negative Negative   HPV 18 DNA NEG:Negative Negative   OTHER HR HPV NEG:Negative Negative     Reviewed and updated as needed this visit by clinical staff  Tobacco  Allergies  Meds  Problems  Med Hx  Surg Hx  Fam Hx  Soc Hx          Reviewed and updated as needed this visit by Provider  Tobacco  Allergies  Meds  Problems  Med Hx  Surg Hx  Fam Hx             Review of Systems  CONSTITUTIONAL: NEGATIVE for fever, chills, change in weight  INTEGUMENTARY/SKIN: NEGATIVE for worrisome rashes, moles or lesions  EYES: NEGATIVE for vision changes or irritation  ENT: NEGATIVE for ear, mouth and throat problems  RESP: NEGATIVE for significant cough or SOB  BREAST: NEGATIVE for masses, tenderness or discharge  CV: NEGATIVE for chest pain, palpitations or peripheral edema  GI: NEGATIVE for nausea, abdominal pain, heartburn, or change in bowel habits  : NEGATIVE for unusual urinary or vaginal symptoms. No  "vaginal bleeding.  MUSCULOSKELETAL: NEGATIVE for significant arthralgias or myalgia  NEURO: NEGATIVE for weakness, dizziness or paresthesias  PSYCHIATRIC: NEGATIVE for changes in mood or affect      OBJECTIVE:   /80 (BP Location: Right arm, Patient Position: Chair, Cuff Size: Adult Regular)   Pulse 83   Temp 99.2  F (37.3  C) (Oral)   Ht 1.702 m (5' 7\")   Wt 73.8 kg (162 lb 12.8 oz)   LMP 01/10/2020 (Exact Date)   SpO2 97%   Breastfeeding No   BMI 25.50 kg/m    Physical Exam  GENERAL: healthy, alert and no distress  EYES: Eyes grossly normal to inspection, PERRL and conjunctivae and sclerae normal  HENT: ear canals and TM's normal, nose and mouth without ulcers or lesions  NECK: no adenopathy, no asymmetry, masses, or scars and thyroid normal to palpation  RESP: lungs clear to auscultation - no rales, rhonchi or wheezes  BREAST: normal without masses, tenderness or nipple discharge and no palpable axillary masses or adenopathy  CV: regular rate and rhythm, normal S1 S2, no S3 or S4, no murmur, click or rub, no peripheral edema and peripheral pulses strong  ABDOMEN: soft, nontender, no hepatosplenomegaly, no masses and bowel sounds normal  MS: no gross musculoskeletal defects noted, no edema  SKIN: no suspicious lesions or rashes  NEURO: Normal strength and tone, mentation intact and speech normal  PSYCH: mentation appears normal, affect normal/bright    Diagnostic Test Results:  Labs reviewed in Epic    ASSESSMENT/PLAN:   1. Routine general medical examination at a health care facility    2. Mild major depression (H)  Needs to restart medications. May need psych consult but patient has been poor at follow up. Start meds and follow up in 4 weeks.   - ARIPiprazole (ABILIFY) 2 MG tablet; Take 1 tablet (2 mg) by mouth daily  Dispense: 90 tablet; Refill: 0  - sertraline (ZOLOFT) 50 MG tablet; Take 1 tablet (50 mg) by mouth daily  Dispense: 90 tablet; Refill: 0    3. Screening for STD (sexually transmitted " "disease)  - Chlamydia trachomatis PCR  - Neisseria gonorrhoeae PCR  - Hepatitis C antibody  - HIV Antigen Antibody Combo  - Treponema Abs w Reflex to RPR and Titer    Patient has been advised of split billing requirements and indicates understanding: Yes  COUNSELING:  Reviewed preventive health counseling, as reflected in patient instructions       Regular exercise       Healthy diet/nutrition       Safe sex practices/STD prevention    Estimated body mass index is 25.5 kg/m  as calculated from the following:    Height as of this encounter: 1.702 m (5' 7\").    Weight as of this encounter: 73.8 kg (162 lb 12.8 oz).    Weight management plan: Discussed healthy diet and exercise guidelines    She reports that she has been smoking cigarettes. She started smoking about 24 years ago. She has a 6.00 pack-year smoking history. She has never used smokeless tobacco.  Tobacco Cessation Action Plan:   Information offered: Patient not interested at this time      Counseling Resources:  ATP IV Guidelines  Pooled Cohorts Equation Calculator  Breast Cancer Risk Calculator  BRCA-Related Cancer Risk Assessment: FHS-7 Tool  FRAX Risk Assessment  ICSI Preventive Guidelines  Dietary Guidelines for Americans, 2010  Marqeta's MyPlate  ASA Prophylaxis  Lung CA Screening    Eli Alvarado PA-C  [facility] [place]  Answers for HPI/ROS submitted by the patient on 4/12/2021   Annual Exam:  If you checked off any problems, how difficult have these problems made it for you to do your work, take care of things at home, or get along with other people?: Very difficult  PHQ9 TOTAL SCORE: 9    "

## 2021-04-13 ASSESSMENT — PATIENT HEALTH QUESTIONNAIRE - PHQ9: SUM OF ALL RESPONSES TO PHQ QUESTIONS 1-9: 9

## 2021-05-27 ENCOUNTER — RECORDS - HEALTHEAST (OUTPATIENT)
Dept: ADMINISTRATIVE | Facility: CLINIC | Age: 41
End: 2021-05-27

## 2021-05-30 NOTE — TELEPHONE ENCOUNTER
[phone] Cell      Call from pt       Prolonged vaginal bleeding       Period started at beginning of month - typically only lasts for a few days but it has been persistent this month      2+ pads / hr   No ABD pain      Not pregnant     A/P:   > Checked with sched for OV within [facility] system - nothing available as new pt (last seen within system 10+ yrs ago) - suggested [facility]     She is familiar with the [facility] and will head there       Ned Terry RN   Triage and Medication Refills        Reason for Disposition    SEVERE vaginal bleeding (i.e., soaking 2 pads or tampons per hour and present 2 or more hours)    Protocols used: VAGINAL BLEEDING - [id number]

## 2021-07-23 ENCOUNTER — OFFICE VISIT (OUTPATIENT)
Dept: FAMILY MEDICINE | Facility: CLINIC | Age: 41
End: 2021-07-23
Payer: COMMERCIAL

## 2021-07-23 VITALS
SYSTOLIC BLOOD PRESSURE: 122 MMHG | BODY MASS INDEX: 25.37 KG/M2 | DIASTOLIC BLOOD PRESSURE: 85 MMHG | HEART RATE: 70 BPM | RESPIRATION RATE: 20 BRPM | OXYGEN SATURATION: 99 % | WEIGHT: 162 LBS | TEMPERATURE: 98.2 F

## 2021-07-23 DIAGNOSIS — S39.012A STRAIN OF MUSCLE, FASCIA AND TENDON OF LOWER BACK, INITIAL ENCOUNTER: Primary | ICD-10-CM

## 2021-07-23 PROCEDURE — 99214 OFFICE O/P EST MOD 30 MIN: CPT | Performed by: PHYSICIAN ASSISTANT

## 2021-07-23 RX ORDER — CYCLOBENZAPRINE HCL 5 MG
10 TABLET ORAL 3 TIMES DAILY PRN
Qty: 30 TABLET | Refills: 0 | Status: SHIPPED | OUTPATIENT
Start: 2021-07-23 | End: 2021-07-30

## 2021-07-23 NOTE — PROGRESS NOTES
Assessment & Plan:      Problem List Items Addressed This Visit     None      Visit Diagnoses     Strain of muscle, fascia and tendon of lower back, initial encounter    -  Primary    Relevant Medications    OXYCODONE ER PO    cyclobenzaprine (FLEXERIL) 5 MG tablet        Medical Decision Making  Patient presents for acute onset right lower back pains.  Symptoms appear most consistent with a muscle strain of the right lower back.  No red flag symptoms of loss of bowel/bladder function or midline spinal tenderness.  Recommend rest, warm compresses, over-the-counter analgesics, and muscle relaxer.  Provided a note for work.  Discussed signs of worsening symptoms and when to follow-up with PCP if no symptom improvement.    30 minutes spent in total for chart review, patient examination, and discussion with patient on labs, counseling, and coordination of care as listed above.     Subjective:      Traci Weiner is a 41 year old female here for evaluation of right lower back pain.  Onset of symptoms was 1 week ago.  Patient does not recall any injury or trauma to the back.  Her symptoms gradually worsened over the course of 2 to 3 days before plateauing.  Patient now notes difficulty with sleep at night as the back pain will wake her up when she moves.  She also notes significant back pain if she is standing for long periods of time.  Patient has tried heat with good temporary relief of her symptoms.  She does note some radiation of the pain down to her right toes.  Patient has had similar symptoms in the past.  The last bad flareup was 9 years ago after the patient had a  section.  Patient states that they did multiple ultrasounds of her spine, and noted a possible bulging disc.  She improved at that time with rest and pain medications.  Patient did not require surgery.  She denies red flag symptoms of loss of bowel/bladder function and muscle weakness.     The following portions of the patient's history  were reviewed and updated as appropriate: allergies, current medications, and problem list.     Review of Systems  Pertinent items are noted in HPI.    Allergies  Allergies   Allergen Reactions     Morphine Rash       Family History   Problem Relation Age of Onset     Diabetes Mother      Thyroid Disease Mother      Hypertension Mother      Diabetes Father      Hypertension Father        Social History     Tobacco Use     Smoking status: Current Some Day Smoker     Packs/day: 0.30     Years: 20.00     Pack years: 6.00     Types: Cigarettes     Start date: 3/13/1997     Smokeless tobacco: Never Used   Substance Use Topics     Alcohol use: No        Objective:      /85   Pulse 70   Temp 98.2  F (36.8  C)   Resp 20   Wt 73.5 kg (162 lb)   LMP 01/10/2020 (Exact Date)   SpO2 99%   Breastfeeding No   BMI 25.37 kg/m    General appearance - alert, well appearing, and in no distress and non-toxic  Back exam - No midline spinal tenderness, tenderness to right lumbosacral paraspinal muscles  Neurological - Straight leg raise is negative bilaterally, however patient does have pain in the right lower back whenever she activates her muscles to elevate her leg, pain improves when she relaxes the right leg  Skin - normal coloration and turgor, no rashes, no suspicious skin lesions noted

## 2021-07-23 NOTE — LETTER
[facility]  [address] [place] [address]  Phone: [phone]  Fax: [phone]    July 23, 2021        Traci G Regine  [address] [place] [address] [place] [id number]          To whom it may concern:    RE: Traci Weiner    She is excused from work for 7/23/2021 - 7/30/2021.  May return sooner as tolerated.    Please contact me for questions or concerns.      Sincerely,        Evita Watts PA-C

## 2021-09-04 ENCOUNTER — HEALTH MAINTENANCE LETTER (OUTPATIENT)
Age: 41
End: 2021-09-04

## 2021-10-19 PROBLEM — F32.9 MAJOR DEPRESSION: Status: ACTIVE | Noted: 2019-08-09

## 2022-03-01 ENCOUNTER — TELEPHONE (OUTPATIENT)
Dept: FAMILY MEDICINE | Facility: CLINIC | Age: 42
End: 2022-03-01
Payer: MEDICAID

## 2022-03-01 NOTE — TELEPHONE ENCOUNTER
We do not sign waivers for COVID-19 vaccines unless a patient has a documented medical allergy. Unfortunately I will not be able to sign this.   Eli Alvarado PA-C

## 2022-03-01 NOTE — TELEPHONE ENCOUNTER
Called and informed patient of the provider's recommendations.    Patient states that it is her right to not get the COVID vaccine and that if the provider does not sign the form she may loss her job.    Patient verbalized understanding and has no further questions or concerns at this time.

## 2022-03-01 NOTE — TELEPHONE ENCOUNTER
Patient called the clinic.  She states that she has a COVID waiver form that needs to be signed by a medical provider.    Patient states that the waiver is so that patient does not receive the COVID vaccine. 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.    Please call patient [phone] with the provider's recommendations (if the provider is able to sign the form and how patient can get the form to the team/provider).    Patient will be faxing the form to [phone]

## 2022-06-11 ENCOUNTER — HEALTH MAINTENANCE LETTER (OUTPATIENT)
Age: 42
End: 2022-06-11

## 2022-06-28 ENCOUNTER — LAB REQUISITION (OUTPATIENT)
Dept: LAB | Facility: CLINIC | Age: 42
End: 2022-06-28
Payer: COMMERCIAL

## 2022-06-28 DIAGNOSIS — Z13.1 ENCOUNTER FOR SCREENING FOR DIABETES MELLITUS: ICD-10-CM

## 2022-06-28 DIAGNOSIS — N95.1 MENOPAUSAL AND FEMALE CLIMACTERIC STATES: ICD-10-CM

## 2022-06-28 DIAGNOSIS — Z13.220 ENCOUNTER FOR SCREENING FOR LIPOID DISORDERS: ICD-10-CM

## 2022-06-28 LAB
ANION GAP SERPL CALCULATED.3IONS-SCNC: 10 MMOL/L (ref 5–18)
BUN SERPL-MCNC: 10 MG/DL (ref 8–22)
CALCIUM SERPL-MCNC: 9.3 MG/DL (ref 8.5–10.5)
CHLORIDE BLD-SCNC: 106 MMOL/L (ref 98–107)
CHOLEST SERPL-MCNC: 162 MG/DL
CO2 SERPL-SCNC: 27 MMOL/L (ref 22–31)
CREAT SERPL-MCNC: 0.65 MG/DL (ref 0.6–1.1)
FASTING STATUS PATIENT QL REPORTED: NORMAL
FSH SERPL-ACNC: 80.4 MIU/ML
GFR SERPL CREATININE-BSD FRML MDRD: >90 ML/MIN/1.73M2
GLUCOSE BLD-MCNC: 86 MG/DL (ref 70–125)
HDLC SERPL-MCNC: 52 MG/DL
LDLC SERPL CALC-MCNC: 96 MG/DL
POTASSIUM BLD-SCNC: 3.6 MMOL/L (ref 3.5–5)
SODIUM SERPL-SCNC: 143 MMOL/L (ref 136–145)
TRIGL SERPL-MCNC: 69 MG/DL

## 2022-06-28 PROCEDURE — 80048 BASIC METABOLIC PNL TOTAL CA: CPT | Mod: ORL | Performed by: FAMILY MEDICINE

## 2022-06-28 PROCEDURE — 83001 ASSAY OF GONADOTROPIN (FSH): CPT | Mod: ORL | Performed by: FAMILY MEDICINE

## 2022-06-28 PROCEDURE — 80061 LIPID PANEL: CPT | Performed by: FAMILY MEDICINE

## 2022-06-28 PROCEDURE — 80061 LIPID PANEL: CPT | Mod: ORL | Performed by: FAMILY MEDICINE

## 2022-06-28 PROCEDURE — 83001 ASSAY OF GONADOTROPIN (FSH): CPT | Performed by: FAMILY MEDICINE

## 2022-06-28 PROCEDURE — 80048 BASIC METABOLIC PNL TOTAL CA: CPT | Performed by: FAMILY MEDICINE

## 2022-07-13 ENCOUNTER — LAB REQUISITION (OUTPATIENT)
Dept: LAB | Facility: CLINIC | Age: 42
End: 2022-07-13
Payer: COMMERCIAL

## 2022-07-13 DIAGNOSIS — D69.6 THROMBOCYTOPENIA, UNSPECIFIED (H): ICD-10-CM

## 2022-07-13 LAB
BASOPHILS # BLD AUTO: 0 10E3/UL (ref 0–0.2)
BASOPHILS NFR BLD AUTO: 1 %
BURR CELLS BLD QL SMEAR: SLIGHT
EOSINOPHIL # BLD AUTO: 0.4 10E3/UL (ref 0–0.7)
EOSINOPHIL NFR BLD AUTO: 5 %
ERYTHROCYTE [DISTWIDTH] IN BLOOD BY AUTOMATED COUNT: 12.6 % (ref 10–15)
HCT VFR BLD AUTO: 40.1 % (ref 35–47)
HGB BLD-MCNC: 12.5 G/DL (ref 11.7–15.7)
IMM GRANULOCYTES # BLD: 0 10E3/UL
IMM GRANULOCYTES NFR BLD: 0 %
LYMPHOCYTES # BLD AUTO: 2.1 10E3/UL (ref 0.8–5.3)
LYMPHOCYTES NFR BLD AUTO: 31 %
MCH RBC QN AUTO: 29.7 PG (ref 26.5–33)
MCHC RBC AUTO-ENTMCNC: 31.2 G/DL (ref 31.5–36.5)
MCV RBC AUTO: 95 FL (ref 78–100)
MONOCYTES # BLD AUTO: 0.4 10E3/UL (ref 0–1.3)
MONOCYTES NFR BLD AUTO: 5 %
NEUTROPHILS # BLD AUTO: 4 10E3/UL (ref 1.6–8.3)
NEUTROPHILS NFR BLD AUTO: 58 %
NRBC # BLD AUTO: 0 10E3/UL
NRBC BLD AUTO-RTO: 0 /100
PLAT MORPH BLD: ABNORMAL
PLATELET # BLD AUTO: 93 10E3/UL (ref 150–450)
POLYCHROMASIA BLD QL SMEAR: SLIGHT
RBC # BLD AUTO: 4.21 10E6/UL (ref 3.8–5.2)
RBC MORPH BLD: ABNORMAL
RETICS # AUTO: 0.11 10E6/UL (ref 0.03–0.1)
RETICS/RBC NFR AUTO: 2.5 % (ref 0.5–2)
WBC # BLD AUTO: 6.9 10E3/UL (ref 4–11)

## 2022-07-13 PROCEDURE — 85045 AUTOMATED RETICULOCYTE COUNT: CPT | Mod: ORL | Performed by: FAMILY MEDICINE

## 2022-07-13 PROCEDURE — 85025 COMPLETE CBC W/AUTO DIFF WBC: CPT | Mod: ORL | Performed by: FAMILY MEDICINE

## 2022-07-14 LAB
PATH REPORT.COMMENTS IMP SPEC: NORMAL
PATH REPORT.COMMENTS IMP SPEC: NORMAL
PATH REPORT.FINAL DX SPEC: NORMAL
PATH REPORT.MICROSCOPIC SPEC OTHER STN: NORMAL
PATH REPORT.RELEVANT HX SPEC: NORMAL

## 2022-07-14 PROCEDURE — 85060 BLOOD SMEAR INTERPRETATION: CPT | Performed by: PATHOLOGY

## 2022-07-20 ENCOUNTER — LAB REQUISITION (OUTPATIENT)
Dept: LAB | Facility: CLINIC | Age: 42
End: 2022-07-20
Payer: COMMERCIAL

## 2022-07-20 DIAGNOSIS — D69.6 THROMBOCYTOPENIA, UNSPECIFIED (H): ICD-10-CM

## 2022-07-20 LAB
ALBUMIN SERPL BCG-MCNC: 4.2 G/DL (ref 3.5–5.2)
ALP SERPL-CCNC: 69 U/L (ref 35–104)
ALT SERPL W P-5'-P-CCNC: 14 U/L (ref 10–35)
ANION GAP SERPL CALCULATED.3IONS-SCNC: 9 MMOL/L (ref 7–15)
AST SERPL W P-5'-P-CCNC: 19 U/L (ref 10–35)
BILIRUB SERPL-MCNC: 0.7 MG/DL
BUN SERPL-MCNC: 7.5 MG/DL (ref 6–20)
CALCIUM SERPL-MCNC: 9.1 MG/DL (ref 8.6–10)
CHLORIDE SERPL-SCNC: 104 MMOL/L (ref 98–107)
CREAT SERPL-MCNC: 0.58 MG/DL (ref 0.51–0.95)
DEPRECATED HCO3 PLAS-SCNC: 26 MMOL/L (ref 22–29)
GFR SERPL CREATININE-BSD FRML MDRD: >90 ML/MIN/1.73M2
GLUCOSE SERPL-MCNC: 104 MG/DL (ref 70–99)
LDH SERPL L TO P-CCNC: 147 U/L (ref 0–250)
POTASSIUM SERPL-SCNC: 4.1 MMOL/L (ref 3.4–5.3)
PROT SERPL-MCNC: 7.3 G/DL (ref 6.4–8.3)
SODIUM SERPL-SCNC: 139 MMOL/L (ref 136–145)

## 2022-07-20 PROCEDURE — 83615 LACTATE (LD) (LDH) ENZYME: CPT | Mod: ORL | Performed by: FAMILY MEDICINE

## 2022-07-20 PROCEDURE — 80053 COMPREHEN METABOLIC PANEL: CPT | Mod: ORL | Performed by: FAMILY MEDICINE

## 2022-07-20 PROCEDURE — 83010 ASSAY OF HAPTOGLOBIN QUANT: CPT | Mod: ORL | Performed by: FAMILY MEDICINE

## 2022-07-21 LAB — HAPTOGLOB SERPL-MCNC: 92 MG/DL (ref 32–197)

## 2022-10-22 ENCOUNTER — HEALTH MAINTENANCE LETTER (OUTPATIENT)
Age: 42
End: 2022-10-22

## 2023-05-30 ENCOUNTER — PATIENT OUTREACH (OUTPATIENT)
Dept: CARE COORDINATION | Facility: CLINIC | Age: 43
End: 2023-05-30

## 2023-06-13 ENCOUNTER — PATIENT OUTREACH (OUTPATIENT)
Dept: CARE COORDINATION | Facility: CLINIC | Age: 43
End: 2023-06-13

## 2023-08-27 ENCOUNTER — HEALTH MAINTENANCE LETTER (OUTPATIENT)
Age: 43
End: 2023-08-27

## 2023-11-05 ENCOUNTER — HEALTH MAINTENANCE LETTER (OUTPATIENT)
Age: 43
End: 2023-11-05

## 2024-03-28 ENCOUNTER — HOSPITAL ENCOUNTER (EMERGENCY)
Facility: CLINIC | Age: 44
Discharge: HOME | End: 2024-03-28
Payer: COMMERCIAL

## 2024-03-28 VITALS
HEART RATE: 76 BPM | WEIGHT: 199.2 LBS | DIASTOLIC BLOOD PRESSURE: 74 MMHG | BODY MASS INDEX: 32.02 KG/M2 | OXYGEN SATURATION: 100 % | TEMPERATURE: 98.1 F | HEIGHT: 66 IN | SYSTOLIC BLOOD PRESSURE: 136 MMHG | RESPIRATION RATE: 16 BRPM

## 2024-03-28 DIAGNOSIS — H66.91 ACUTE RIGHT OTITIS MEDIA: ICD-10-CM

## 2024-03-28 PROCEDURE — 99283 EMERGENCY DEPT VISIT LOW MDM: CPT

## 2024-03-28 RX ORDER — FLUTICASONE PROPIONATE 50 MCG
2 SPRAY, SUSPENSION (ML) NASAL DAILY
Qty: 11.1 ML | Refills: 0 | Status: SHIPPED | OUTPATIENT
Start: 2024-03-28

## 2024-03-28 ASSESSMENT — COLUMBIA-SUICIDE SEVERITY RATING SCALE - C-SSRS
6. HAVE YOU EVER DONE ANYTHING, STARTED TO DO ANYTHING, OR PREPARED TO DO ANYTHING TO END YOUR LIFE?: NO
1. IN THE PAST MONTH, HAVE YOU WISHED YOU WERE DEAD OR WISHED YOU COULD GO TO SLEEP AND NOT WAKE UP?: NO
2. HAVE YOU ACTUALLY HAD ANY THOUGHTS OF KILLING YOURSELF IN THE PAST MONTH?: NO

## 2024-03-28 NOTE — ED PROVIDER NOTES
EMERGENCY DEPARTMENT ENCOUNTER      NAME: Traci Weiner  AGE: 43 year old female  YOB: 1980  MRN: [id number]  EVALUATION DATE & TIME: 03/28/24 12:22 PM    PCP: Eli Alvarado    ED PROVIDER: Lisbeth Aguilar PA-C      CHIEF COMPLAINT:  Otalgia      FINAL IMPRESSION:  1. Acute right otitis media          ED COURSE & MEDICAL DECISION MAKING:  Pertinent Labs & Imaging studies reviewed. (See chart for details)    The patient is a 43 year old-year-old female with a history of allergies presenting to the emergency department for evaluation of right-sided ear fullness with muffled underwater hearing for the past week.  No drainage from ear, no fevers.  No significant pain.  No sinus congestion or sore throat.  The patient is otherwise in her baseline state of health.    On exam, patient has erythema, bulging TM suggestive of acute otitis media.  No erythema, inflammation, or tenderness with manipulation of auricle/tragus suggest external otitis.  No tenderness to palpation of mastoid to suggest mastoiditis.  Oropharynx is clear without exudates or erythema to suggest strep pharyngitis.  No systemic signs or symptoms to suggest sepsis, bacteremia, or deep space infection.  Vitals on arrival significant for elevated blood pressure, otherwise within normal limits and are reassuring.  Patient is able to tolerate p.o. without difficulty and is hemodynamically stable.  Overall, history and exam is most suggestive of acute otitis media.  No antibiotic use within the last 3 months.  Plan for discharge home with prescription for Augmentin as well as Flonase given her history of allergies.    The importance of close follow up was discussed.  I discussed plan for close follow-up with her primary care clinician within the next 3-5 days. We reviewed warning signs and symptoms, and I discussed with Ms. Weiner that she should return to the emergency department immediately if she develops any new or worsening  symptoms. I provided additional verbal discharge instructions. Ms. Weiner expressed understanding and agreement with this plan of care, her questions were answered to the best of my ability, and she was discharged in stable condition.     At the conclusion of the encounter I discussed the results of all of the tests and the disposition. The questions were answered. The patient or family acknowledged understanding and was agreeable with the care plan.       ED COURSE:  12:22 PM I met and introduced myself to the patient. I gathered initial history and performed an initial physical exam. We discussed options and plan for diagnostics and treatment here in the ED. I discussed the plan for discharge with the patient, and patient is agreeable. We discussed supportive cares at home and reasons for return to the ER including new or worsening symptoms - all questions and concerns addressed to the best of my ability. Strict return precautions discussed. Patient to be discharged by RN.      Medical Decision Making  Obtained supplemental history:Supplemental history obtained?: No  Reviewed external records: External records reviewed?: No  Care impacted by chronic illness:N/A  Care significantly affected by social determinants of health:N/A  Did you consider but not order tests?: Work up considered but not performed and documented in chart, if applicable  Did you interpret images independently?: Independent interpretation of ECG and images noted in documentation, when applicable.  Consultation discussion with other provider:Did you involve another provider (consultant, MH, pharmacy, etc.)?: No  Discharge. I prescribed additional prescription strength medication(s) as charted. See documentation for any additional details.      CRITICAL CARE:  Not applicable      MEDICATIONS GIVEN IN THE EMERGENCY:  Medications - No data to display    NEW PRESCRIPTIONS STARTED AT TODAY'S ER VISIT  New Prescriptions    AMOXICILLIN-CLAVULANATE  (AUGMENTIN) 875-125 MG TABLET    Take 1 tablet by mouth 2 times daily    FLUTICASONE (FLONASE) 50 MCG/ACT NASAL SPRAY    Spray 2 sprays into both nostrils daily          =================================================================    HPI    Patient information was obtained from: the patient     Use of Intrepreter: N/A         Traci Weiner is a 43 year old female with pertinent medical history of allergies who presents to the emergency department for evaluation of right ear fullness.    The patient reports 1 week of right ear fullness sensation, described as muffled hearing as though she was hearing underwater.  No significant ear pain.  Her ear fullness feels reminiscent of prior ear infections.  No recent antibiotic use within the past 3 months.  No fevers, chills, drainage from ear, rhinorrhea, sinus congestion, or any other associated symptoms endorsed at this time.      PAST MEDICAL HISTORY:  Past Medical History:   Diagnosis Date    Depressive disorder     Dysmenorrhea 2019    Heavy menstrual bleeding 10/8/2019    Added automatically from request for surgery [id number]    Menometrorrhagia 2019    Other ovarian cyst, left side 2019       PAST SURGICAL HISTORY:  Past Surgical History:   Procedure Laterality Date    APPENDECTOMY  2016    CHOLECYSTECTOMY  2004    gall bladder removed    GYN SURGERY       X 4    HYSTERECTOMY TOTAL ABDOMINAL, BILATERAL SALPINGO-OOPHORECTOMY, COMBINED Left 2020    Procedure: HYSTERECTOMY, TOTAL, ABDOMINAL, WITH Left SALPINGECTOMY-oophorectomy;  Surgeon: Natalie Armstrong MD;  Location: [facility] OR    LAPAROTOMY, LYSIS ADHESIONS, COMBINED N/A 2020    Procedure: Laparotomy, lysis adhesions, combined;  Surgeon: Anselmo Newsome MD;  Location: [facility] OR       CURRENT MEDICATIONS:    Cannot display prior to admission medications because the patient has not been admitted in this contact.       ALLERGIES:  Allergies   Allergen Reactions    Morphine Rash  "      FAMILY HISTORY:  Family History   Problem Relation Age of Onset    Diabetes Mother     Thyroid Disease Mother     Hypertension Mother     Diabetes Father     Hypertension Father        SOCIAL HISTORY:  Social History     Tobacco Use    Smoking status: Some Days     Packs/day: 0.30     Years: 20.00     Additional pack years: 0.00     Total pack years: 6.00     Types: Cigarettes     Start date: 3/13/1997    Smokeless tobacco: Never   Substance Use Topics    Alcohol use: No    Drug use: Yes     Types: Marijuana        VITALS:    First Vitals:  Patient Vitals for the past 24 hrs:   BP Temp Temp src Pulse Resp SpO2 Height Weight   03/28/24 1145 (!) 140/87 97.3  F (36.3  C) Oral 89 16 100 % 1.676 m (5' 6\") 90.4 kg (199 lb 3.2 oz)       Patient Vitals for the past 24 hrs:   BP Temp Temp src Pulse Resp SpO2 Height Weight   03/28/24 1145 (!) 140/87 97.3  F (36.3  C) Oral 89 16 100 % 1.676 m (5' 6\") 90.4 kg (199 lb 3.2 oz)         PHYSICAL EXAM  VITAL SIGNS: BP (!) 140/87   Pulse 89   Temp 97.3  F (36.3  C) (Oral)   Resp 16   Ht 1.676 m (5' 6\")   Wt 90.4 kg (199 lb 3.2 oz)   LMP 01/10/2020 (Exact Date)   SpO2 100%   BMI 32.15 kg/m     GENERAL: Awake, alert, answering questions appropriately, in no acute distress.  HEENT: Moist mucous membranes. Posterior oropharynx clear with no erythema, no exudate. No tonsillar hypertrophy. Uvula midline. Tolerating secretions, no drooling. No nuchal rigidity.  No erythema, edema, or tenderness with manipulation of auricle or tragus bilaterally.  External auditory canal does not appear macerated or edematous.  Right tympanic membrane is erythematous and bulging, no perforations noted, no drainage.  Left tympanic membrane with effusion present, but no erythema.  Nares clear.  No tenderness to palpation overlying mastoid region bilaterally.  SPEECH:  Easy to understand speech, Normal volume and nacho. Normal phonation.  PULMONARY: No respiratory distress, Breathing comfortably " on room air. Lungs clear to auscultation bilaterally.  CARDIOVASCULAR: Regular rate and rhythm, radial pulses present, symmetric, and normal.  ABDOMINAL: Soft, Nondistended, Nontender, No rebound or guarding  EXTREMITIES: Extremities are warm and well perfused.   NEUROLOGIC: Moving all extremities spontaneously.   SKIN: Exposed areas of skin warm, dry, no rashes.  PSYCH: Normal mood and affect.           RADIOLOGY/LAB:  Reviewed all pertinent imaging. Please see official radiology report.  All pertinent labs reviewed and interpreted.                 Lisbeth Aguilar PA-C  Emergency Medicine  [facility] EMERGENCY ROOM  [address] [address]  [phone]  Dept: [phone]     Lisbeth Aguilar PA-C  03/28/24 4305

## 2024-03-28 NOTE — ED TRIAGE NOTES
"Presents with right ear fullness. Denies pain. Pt states \" feels like water is in there and I have tried everything to clear it but I can't\". Denies nausea, dizziness or diarrhea. Pt also reports history of ear infections but \"doesn't feel like an ear infection\".      Triage Assessment (Adult)       Row Name 03/28/24 1147          Triage Assessment    Airway WDL WDL        Respiratory WDL    Respiratory WDL WDL        Skin Circulation/Temperature WDL    Skin Circulation/Temperature WDL WDL        Cardiac WDL    Cardiac WDL WDL        Peripheral/Neurovascular WDL    Peripheral Neurovascular WDL WDL        Cognitive/Neuro/Behavioral WDL    Cognitive/Neuro/Behavioral WDL WDL                     "

## 2024-03-28 NOTE — DISCHARGE INSTRUCTIONS
You were seen in the emergency department today for your symptoms.  Your exam is consistent with right sided ear infection.      Please take the antibiotics as prescribed.  Also consider using Flonase an over-the-counter nose spray 2 sprays per nostril each day every day for the next several weeks.     Please follow up with your primary care clinician within the next 3-5 days for close recheck.      Call your doctor now or seek immediate medical care if:    You have new or increasing ear pain.     You have new or increasing pus or blood draining from your ear.     You have a fever with a stiff neck or a severe headache.   Watch closely for changes in your health, and be sure to contact your doctor if:    You have new or worse symptoms.     You are not getting better after taking an antibiotic for 2 days.

## 2024-06-06 ENCOUNTER — PATIENT OUTREACH (OUTPATIENT)
Dept: CARE COORDINATION | Facility: CLINIC | Age: 44
End: 2024-06-06
Payer: COMMERCIAL

## 2024-07-04 ENCOUNTER — PATIENT OUTREACH (OUTPATIENT)
Dept: CARE COORDINATION | Facility: CLINIC | Age: 44
End: 2024-07-04
Payer: COMMERCIAL

## 2024-10-20 ENCOUNTER — HEALTH MAINTENANCE LETTER (OUTPATIENT)
Age: 44
End: 2024-10-20

## 2025-01-02 ENCOUNTER — PATIENT OUTREACH (OUTPATIENT)
Dept: CARE COORDINATION | Facility: CLINIC | Age: 45
End: 2025-01-02

## (undated) DEVICE — ESU PENCIL W/HOLSTER E2350H

## (undated) DEVICE — TUBING SUCTION MEDI-VAC SOFT 3/16"X20' N520A

## (undated) DEVICE — SU PLAIN 3-0 CT 27" 852H

## (undated) DEVICE — SU PDS II 0 CTX 60" Z990G

## (undated) DEVICE — DRSG STERI STRIP 1/2X4" R1547

## (undated) DEVICE — TAPE MEDIPORE 4"X2YD 2864

## (undated) DEVICE — SPONGE LAP 18X18" X8435

## (undated) DEVICE — PAD PERI INDIV WRAP 11" 2022A

## (undated) DEVICE — TUBING SMOKE EVAC 1CMX3M SEA3710

## (undated) DEVICE — PAD CHUX UNDERPAD 30X36" P3036C

## (undated) DEVICE — BARRIER SEPRAFILM 5X6" SINGLE SHEET 4301-02

## (undated) DEVICE — WIPES FOLEY CARE SURESTEP PROVON DFC100

## (undated) DEVICE — PREP CHLORAPREP 26ML TINTED ORANGE  260815

## (undated) DEVICE — ESU ELEC BLADE HEX-LOCKING 2.5" E1450X

## (undated) DEVICE — ESU GROUND PAD UNIVERSAL W/O CORD

## (undated) DEVICE — LINEN GOWN X4 5410

## (undated) DEVICE — SOL NACL 0.9% IRRIG 1000ML BOTTLE 2F7124

## (undated) DEVICE — ESU ELEC BLADE 6" COATED E1450-6

## (undated) DEVICE — CATH TRAY FOLEY SURESTEP 16FR WDRAIN BAG STLK LATEX A300316A

## (undated) DEVICE — SYR BULB IRRIG 50ML LATEX FREE 0035280

## (undated) DEVICE — SOL WATER IRRIG 1000ML BOTTLE 2F7114

## (undated) DEVICE — SU VICRYL 2-0 CT-1 27" J339H

## (undated) DEVICE — Device

## (undated) DEVICE — LINEN TOWEL PACK X5 5464

## (undated) DEVICE — SUCTION MANIFOLD DORNOCH ULTRA CART UL-CL500

## (undated) DEVICE — GLOVE SENSICARE 7.5 MSG1075 LATEX FREE

## (undated) DEVICE — ESU PENCIL W/SMOKE EVAC NEPTUNE STRYKER 0703-046-000

## (undated) DEVICE — SUCTION TIP POOLE K770

## (undated) DEVICE — LIGHT HANDLE X2

## (undated) DEVICE — BLADE CLIPPER SGL USE 9680

## (undated) DEVICE — SU MONOCRYL 4-0 PS-2 18" UND Y496G

## (undated) DEVICE — NDL COUNTER 20CT 31142493

## (undated) DEVICE — SU VICRYL 0 CT-2 27" J334H

## (undated) DEVICE — DRSG ABDOMINAL 07 1/2X8" 7197D

## (undated) DEVICE — STPL SKIN 35W ROTATING HEAD PRW35

## (undated) DEVICE — ESU ELEC LEEP BALL 5MM

## (undated) DEVICE — GLOVE PROTEXIS MICRO 7.0  2D73PM70

## (undated) DEVICE — BLADE KNIFE SURG 11 371111

## (undated) DEVICE — SU VICRYL 2-0 TIE 54" J607H

## (undated) DEVICE — BLADE KNIFE SURG 10 371110

## (undated) DEVICE — PREP SKIN SCRUB TRAY 4461A

## (undated) DEVICE — DRSG TELFA ISLAND 4X8" 7541

## (undated) DEVICE — LINEN ORTHO PACK 5446

## (undated) DEVICE — SU VICRYL 0 CT-1 3X27" J430T

## (undated) DEVICE — PREP POVIDONE IODINE SOLUTION 10% 120ML

## (undated) DEVICE — SU VICRYL 3-0 CT-2 27" J332H

## (undated) DEVICE — SUCTION TIP YANKAUER STR K87

## (undated) RX ORDER — PROPOFOL 10 MG/ML
INJECTION, EMULSION INTRAVENOUS
Status: DISPENSED
Start: 2020-01-29

## (undated) RX ORDER — DEXAMETHASONE SODIUM PHOSPHATE 4 MG/ML
INJECTION, SOLUTION INTRA-ARTICULAR; INTRALESIONAL; INTRAMUSCULAR; INTRAVENOUS; SOFT TISSUE
Status: DISPENSED
Start: 2020-01-29

## (undated) RX ORDER — LIDOCAINE HYDROCHLORIDE 20 MG/ML
INJECTION, SOLUTION EPIDURAL; INFILTRATION; INTRACAUDAL; PERINEURAL
Status: DISPENSED
Start: 2020-01-29

## (undated) RX ORDER — HYDROMORPHONE HYDROCHLORIDE 1 MG/ML
INJECTION, SOLUTION INTRAMUSCULAR; INTRAVENOUS; SUBCUTANEOUS
Status: DISPENSED
Start: 2020-01-29

## (undated) RX ORDER — CEFAZOLIN SODIUM 2 G/100ML
INJECTION, SOLUTION INTRAVENOUS
Status: DISPENSED
Start: 2020-01-29

## (undated) RX ORDER — FENTANYL CITRATE 50 UG/ML
INJECTION, SOLUTION INTRAMUSCULAR; INTRAVENOUS
Status: DISPENSED
Start: 2020-01-29

## (undated) RX ORDER — PHENAZOPYRIDINE HYDROCHLORIDE 200 MG/1
TABLET, FILM COATED ORAL
Status: DISPENSED
Start: 2020-01-29

## (undated) RX ORDER — ONDANSETRON 2 MG/ML
INJECTION INTRAMUSCULAR; INTRAVENOUS
Status: DISPENSED
Start: 2020-01-29

## (undated) RX ORDER — ACETAMINOPHEN 325 MG/1
TABLET ORAL
Status: DISPENSED
Start: 2020-01-29